# Patient Record
Sex: FEMALE | Race: WHITE | Employment: OTHER | URBAN - NONMETROPOLITAN AREA
[De-identification: names, ages, dates, MRNs, and addresses within clinical notes are randomized per-mention and may not be internally consistent; named-entity substitution may affect disease eponyms.]

---

## 2015-09-24 LAB
AVERAGE GLUCOSE: NORMAL
HBA1C MFR BLD: 5.4 %

## 2017-08-07 LAB — TSH SERPL DL<=0.05 MIU/L-ACNC: NORMAL M[IU]/L

## 2018-12-13 LAB
ALBUMIN SERPL-MCNC: NORMAL G/DL
ALP BLD-CCNC: NORMAL U/L
ALT SERPL-CCNC: NORMAL U/L
ANION GAP SERPL CALCULATED.3IONS-SCNC: NORMAL MMOL/L
AST SERPL-CCNC: NORMAL U/L
BILIRUB SERPL-MCNC: NORMAL MG/DL
BUN BLDV-MCNC: NORMAL MG/DL
CALCIUM SERPL-MCNC: NORMAL MG/DL
CHLORIDE BLD-SCNC: NORMAL MMOL/L
CHOLESTEROL, TOTAL: NORMAL
CHOLESTEROL/HDL RATIO: NORMAL
CO2: NORMAL
CREAT SERPL-MCNC: NORMAL MG/DL
GFR CALCULATED: NORMAL
GLUCOSE BLD-MCNC: NORMAL MG/DL
HDLC SERPL-MCNC: NORMAL MG/DL
LDL CHOLESTEROL CALCULATED: NORMAL
POTASSIUM SERPL-SCNC: NORMAL MMOL/L
SODIUM BLD-SCNC: NORMAL MMOL/L
TOTAL PROTEIN: NORMAL
TRIGL SERPL-MCNC: NORMAL MG/DL
VLDLC SERPL CALC-MCNC: NORMAL MG/DL

## 2019-07-31 ENCOUNTER — OFFICE VISIT (OUTPATIENT)
Dept: FAMILY MEDICINE CLINIC | Age: 59
End: 2019-07-31
Payer: COMMERCIAL

## 2019-07-31 ENCOUNTER — TELEPHONE (OUTPATIENT)
Dept: FAMILY MEDICINE CLINIC | Age: 59
End: 2019-07-31

## 2019-07-31 VITALS
OXYGEN SATURATION: 98 % | DIASTOLIC BLOOD PRESSURE: 76 MMHG | BODY MASS INDEX: 31.28 KG/M2 | WEIGHT: 170 LBS | HEART RATE: 74 BPM | SYSTOLIC BLOOD PRESSURE: 130 MMHG | HEIGHT: 62 IN

## 2019-07-31 DIAGNOSIS — F41.9 ANXIETY: ICD-10-CM

## 2019-07-31 DIAGNOSIS — I10 ESSENTIAL HYPERTENSION: Primary | ICD-10-CM

## 2019-07-31 DIAGNOSIS — Z12.39 SCREENING BREAST EXAMINATION: ICD-10-CM

## 2019-07-31 DIAGNOSIS — Z00.00 ROUTINE GENERAL MEDICAL EXAMINATION AT A HEALTH CARE FACILITY: ICD-10-CM

## 2019-07-31 PROCEDURE — 99396 PREV VISIT EST AGE 40-64: CPT | Performed by: INTERNAL MEDICINE

## 2019-07-31 RX ORDER — LANOLIN ALCOHOL/MO/W.PET/CERES
CREAM (GRAM) TOPICAL
COMMUNITY

## 2019-07-31 RX ORDER — ALPRAZOLAM 0.5 MG/1
0.5 TABLET ORAL 3 TIMES DAILY PRN
COMMUNITY
End: 2019-07-31 | Stop reason: SDUPTHER

## 2019-07-31 RX ORDER — ALPRAZOLAM 0.5 MG/1
0.5 TABLET ORAL 2 TIMES DAILY PRN
Qty: 60 TABLET | Refills: 0 | Status: SHIPPED | OUTPATIENT
Start: 2019-07-31 | End: 2019-10-29

## 2019-07-31 RX ORDER — LISINOPRIL 10 MG/1
TABLET ORAL
Refills: 0 | COMMUNITY
Start: 2019-07-01 | End: 2019-07-31 | Stop reason: SDUPTHER

## 2019-07-31 RX ORDER — LISINOPRIL 10 MG/1
10 TABLET ORAL DAILY
Qty: 90 TABLET | Refills: 1 | Status: SHIPPED | OUTPATIENT
Start: 2019-07-31 | End: 2020-01-15 | Stop reason: SDUPTHER

## 2019-07-31 ASSESSMENT — PATIENT HEALTH QUESTIONNAIRE - PHQ9
SUM OF ALL RESPONSES TO PHQ QUESTIONS 1-9: 0
1. LITTLE INTEREST OR PLEASURE IN DOING THINGS: 0
2. FEELING DOWN, DEPRESSED OR HOPELESS: 0
SUM OF ALL RESPONSES TO PHQ QUESTIONS 1-9: 0
SUM OF ALL RESPONSES TO PHQ9 QUESTIONS 1 & 2: 0

## 2019-08-01 ENCOUNTER — OFFICE VISIT (OUTPATIENT)
Dept: PODIATRY | Age: 59
End: 2019-08-01
Payer: COMMERCIAL

## 2019-08-01 VITALS
BODY MASS INDEX: 31.1 KG/M2 | TEMPERATURE: 97.3 F | DIASTOLIC BLOOD PRESSURE: 72 MMHG | HEART RATE: 70 BPM | SYSTOLIC BLOOD PRESSURE: 117 MMHG | WEIGHT: 169 LBS | OXYGEN SATURATION: 98 % | HEIGHT: 62 IN

## 2019-08-01 DIAGNOSIS — B07.0 PLANTAR VERRUCA: Primary | ICD-10-CM

## 2019-08-01 DIAGNOSIS — M79.671 PAIN IN RIGHT FOOT: ICD-10-CM

## 2019-08-01 DIAGNOSIS — M77.41 METATARSALGIA OF RIGHT FOOT: ICD-10-CM

## 2019-08-01 PROCEDURE — 99203 OFFICE O/P NEW LOW 30 MIN: CPT | Performed by: PODIATRIST

## 2019-08-01 PROCEDURE — 17110 DESTRUCTION B9 LES UP TO 14: CPT | Performed by: PODIATRIST

## 2019-08-01 NOTE — PROGRESS NOTES
Musculoskeletal/ Orthopedic examination:    Equinis: Absent bilateral  Dorsiflexion, plantarflexion, inversion, eversion bilateral 5 out of 5 muscle strength  Wiggling toes  Negative Homans  Tenderness to palpation second and third metatarsal head plantar right foot. Dermatology examination:    No open skin lesions or abrasions bilateral lower extremity. Significant surrounding hyperkeratotic tissue right plantar second and third metatarsal head. Multiple black foci consistent with plantar verruca right plantar second and third metatarsal head. Assessment and Plan:  Angeline was seen today for foot pain. Diagnoses and all orders for this visit:    Plantar verruca    Pain in right foot    Metatarsalgia of right foot        No follow-ups on file. Patient seen as a new referral for right plantar verruca second third metatarsal head. WART TX:   Verbal informed consent was obtained   #15 blade used to debride plantar verruca and sterile manner plantar second and third metatarsal head right foot. Cryo-freeze applied to plantar verruca. Patient tolerated well. Band-aid applied. Salinocaine dispensed. Apply once daily with band-aid as directed. The patient tolerated the procedure well and was given proper instruction for continued care. Follow-up 1 month. Seen By:  Deepti Jones DPM      Document was created using voice recognition software. Note was reviewed, however may contain grammatical errors.

## 2019-08-01 NOTE — PROGRESS NOTES
Emotionally abused: Not on file     Physically abused: Not on file     Forced sexual activity: Not on file   Other Topics Concern    Not on file   Social History Narrative    Not on file       Vitals:    07/31/19 1005   BP: 130/76   Pulse: 74   SpO2: 98%   Weight: 170 lb (77.1 kg)   Height: 5' 2\" (1.575 m)       Physical Exam  Const: Appears well developed and well nourished. Appears overweight. No signs of acute  distress present. Eyes: EOMI in both eyes. PERRL. ENMT: . (External Ears) Tympanic membranes are intact. Neck: Supple and symmetric. Palpation reveals no adenopathy. No masses appreciated. Thyroid exhibits no nodule or thyromegaly. No JVD. Carotids: 2+ and equal bilaterally, without  bruits. Resp: No rales, rhonchi or wheezes appreciated over the lungs bilaterally. CV: Rate is regular. Rhythm is regular. S1 is normal. S2 is normal. No gallop or rubs. No  heart murmur appreciated. Extremities: No clubbing, cyanosis or edema. Abdomen: Bowel sounds are normoactive. Palpation reveals softness, with no distension,  organomegaly or tenderness. No abdominal masses palpable. No palpable hepatosplenomegaly. Musculo: Walks with a normal gait. Lower Extremities: Full ROM bilaterally. Skin: Dry and warm with no rash. Neuro: Alert and oriented x3. Mood is normal. Affect is normal. Speech is articulate and  fluent. Upper Extremities: motor strength is intact. Normal muscle tone bilaterally. Lower  Extremities: motor strength is intact. Normal muscle tone bilaterally. Sensation intact to light  touch. Reflexes: DTR's are symmetric, intact and 2+ bilaterally. Psych: Patient's attitude is cooperative. Patient's affect is appropriate. Judgement is realistic. Insight is appropriate. Lymph nodes none palpable. Breast examination patient declined today.       Assessment and Plan:  Angeline was seen today for hypertension and annual exam.    Diagnoses and all orders for this visit:    Essential hypertension  -

## 2019-08-07 ENCOUNTER — TELEPHONE (OUTPATIENT)
Dept: FAMILY MEDICINE CLINIC | Age: 59
End: 2019-08-07

## 2019-08-07 DIAGNOSIS — Z12.31 ENCOUNTER FOR SCREENING MAMMOGRAM FOR BREAST CANCER: ICD-10-CM

## 2019-08-07 DIAGNOSIS — Z13.1 ENCOUNTER FOR SCREENING FOR DIABETES MELLITUS: ICD-10-CM

## 2019-08-07 NOTE — TELEPHONE ENCOUNTER
[facility] scheduling desk calling in she needs a new dx code added to her screening mammogram. The one we provioded does not pass. Ok to print order and write in a new code?  The ones that passes is z12.31

## 2019-08-19 ENCOUNTER — TELEPHONE (OUTPATIENT)
Dept: ADMINISTRATIVE | Age: 59
End: 2019-08-19

## 2019-08-22 ENCOUNTER — OFFICE VISIT (OUTPATIENT)
Dept: PODIATRY | Age: 59
End: 2019-08-22
Payer: COMMERCIAL

## 2019-08-22 VITALS
HEART RATE: 70 BPM | HEIGHT: 62 IN | BODY MASS INDEX: 31.1 KG/M2 | WEIGHT: 169 LBS | TEMPERATURE: 97 F | OXYGEN SATURATION: 99 %

## 2019-08-22 DIAGNOSIS — B07.0 PLANTAR VERRUCA: Primary | ICD-10-CM

## 2019-08-22 DIAGNOSIS — M79.671 PAIN IN RIGHT FOOT: ICD-10-CM

## 2019-08-22 DIAGNOSIS — M77.41 METATARSALGIA OF RIGHT FOOT: ICD-10-CM

## 2019-08-22 PROCEDURE — 99213 OFFICE O/P EST LOW 20 MIN: CPT | Performed by: PODIATRIST

## 2019-08-22 PROCEDURE — 17110 DESTRUCTION B9 LES UP TO 14: CPT | Performed by: PODIATRIST

## 2019-08-22 NOTE — PROGRESS NOTES
plantarflexion, inversion, eversion bilateral 5 out of 5 muscle strength  Wiggling toes  Negative Homans  Tenderness to palpation second and third metatarsal head plantar right foot. Dermatology examination:    No open skin lesions or abrasions bilateral lower extremity. Significant surrounding hyperkeratotic tissue right plantar second and third metatarsal head. Multiple black foci consistent with plantar verruca right plantar second and third metatarsal head-improving  Surrounding hyperkeratotic tissue right central foot. Assessment and Plan:  Angeline was seen today for foot pain. Diagnoses and all orders for this visit:    Plantar verruca    Pain in right foot    Metatarsalgia of right foot        Return in about 1 month (around 9/22/2019). Follow-up for right plantar verruca second third metatarsal head. WART TX:   Verbal informed consent was obtained   #15 blade used to debride plantar verruca and sterile manner plantar second and third metatarsal head right foot. Salinocaine applied to plantar verruca. Patient tolerated well. Band-aid applied. Salinocaine dispensed. Apply once daily with band-aid as directed. The patient tolerated the procedure well and was given proper instruction for continued care. I did dispense new donut-shaped padding and Band-Aid today. Follow-up 1 month. Seen By:  Deepti Jones DPM      Document was created using voice recognition software. Note was reviewed, however may contain grammatical errors.

## 2019-08-30 PROBLEM — Z00.00 ROUTINE GENERAL MEDICAL EXAMINATION AT A HEALTH CARE FACILITY: Status: RESOLVED | Noted: 2019-07-31 | Resolved: 2019-08-30

## 2019-10-11 DIAGNOSIS — Z12.31 ENCOUNTER FOR SCREENING MAMMOGRAM FOR BREAST CANCER: ICD-10-CM

## 2019-11-25 ENCOUNTER — OFFICE VISIT (OUTPATIENT)
Dept: FAMILY MEDICINE CLINIC | Age: 59
End: 2019-11-25
Payer: COMMERCIAL

## 2019-11-25 VITALS
TEMPERATURE: 98.3 F | HEIGHT: 62 IN | BODY MASS INDEX: 32.2 KG/M2 | OXYGEN SATURATION: 96 % | HEART RATE: 97 BPM | WEIGHT: 175 LBS

## 2019-11-25 DIAGNOSIS — I80.8 PHLEBITIS OF SUPERFICIAL VEIN OF UPPER EXTREMITY: Primary | ICD-10-CM

## 2019-11-25 PROCEDURE — 99213 OFFICE O/P EST LOW 20 MIN: CPT | Performed by: FAMILY MEDICINE

## 2019-11-25 RX ORDER — CEPHALEXIN 500 MG/1
500 CAPSULE ORAL 3 TIMES DAILY
Qty: 21 CAPSULE | Refills: 0 | Status: SHIPPED | OUTPATIENT
Start: 2019-11-25 | End: 2019-12-02

## 2019-12-03 ENCOUNTER — TELEPHONE (OUTPATIENT)
Dept: FAMILY MEDICINE CLINIC | Age: 59
End: 2019-12-03

## 2019-12-17 ENCOUNTER — HOSPITAL ENCOUNTER (OUTPATIENT)
Dept: HOSPITAL 83 - RAD | Age: 59
Discharge: HOME | End: 2019-12-17
Attending: SPECIALIST
Payer: COMMERCIAL

## 2019-12-17 DIAGNOSIS — R13.10: ICD-10-CM

## 2019-12-17 DIAGNOSIS — K21.0: Primary | ICD-10-CM

## 2020-01-15 ENCOUNTER — OFFICE VISIT (OUTPATIENT)
Dept: FAMILY MEDICINE CLINIC | Age: 60
End: 2020-01-15
Payer: COMMERCIAL

## 2020-01-15 VITALS
WEIGHT: 174 LBS | BODY MASS INDEX: 31.83 KG/M2 | OXYGEN SATURATION: 98 % | SYSTOLIC BLOOD PRESSURE: 122 MMHG | HEART RATE: 86 BPM | DIASTOLIC BLOOD PRESSURE: 76 MMHG

## 2020-01-15 PROCEDURE — 99213 OFFICE O/P EST LOW 20 MIN: CPT | Performed by: INTERNAL MEDICINE

## 2020-01-15 RX ORDER — LISINOPRIL 10 MG/1
10 TABLET ORAL DAILY
Qty: 90 TABLET | Refills: 1 | Status: SHIPPED
Start: 2020-01-15 | End: 2020-07-15 | Stop reason: SDUPTHER

## 2020-01-15 RX ORDER — OMEPRAZOLE 40 MG/1
40 CAPSULE, DELAYED RELEASE ORAL DAILY
COMMUNITY
End: 2020-04-16 | Stop reason: ALTCHOICE

## 2020-01-15 ASSESSMENT — PATIENT HEALTH QUESTIONNAIRE - PHQ9
SUM OF ALL RESPONSES TO PHQ QUESTIONS 1-9: 0
1. LITTLE INTEREST OR PLEASURE IN DOING THINGS: 0
SUM OF ALL RESPONSES TO PHQ9 QUESTIONS 1 & 2: 0
SUM OF ALL RESPONSES TO PHQ QUESTIONS 1-9: 0
2. FEELING DOWN, DEPRESSED OR HOPELESS: 0

## 2020-01-15 NOTE — PROGRESS NOTES
muscle tone bilaterally. Sensation intact to light  touch. Reflexes: DTR's are symmetric, intact and 2+ bilaterally. Psych: Patient's attitude is cooperative. Patient's affect is appropriate. Judgement is realistic. Insight is appropriate. Lymph nodes none palpable. Breast examination patient declined today. Assessment and Plan:  Angeline was seen today for hypertension. Diagnoses and all orders for this visit:    Anxiety    Essential hypertension  -     lisinopril (PRINIVIL;ZESTRIL) 10 MG tablet; Take 1 tablet by mouth daily  -     Cancel: Lipid Panel; Future  -     Cancel: Comprehensive Metabolic Panel; Future  -     Cancel: CBC Auto Differential; Future  -     Cancel: Urinalysis; Future  -     Urinalysis; Future  -     Lipid Panel; Future  -     Comprehensive Metabolic Panel; Future  -     CBC Auto Differential; Future      Plan: I will see her back in 6 months for complete examination. Prescription management performed. Blood work in the next couple weeks. Should be fasting at that time. Follow-up with ear nose and throat regarding her swallow study. Weight loss attempts. Continue to monitor blood pressure closely. Notify us with problems in the interim. Return in about 6 months (around 7/15/2020). Seen By:  Kuldeep Pizarro MD      *Document was created using voice recognition software. Note was reviewed however may contain grammatical errors.

## 2020-04-15 ENCOUNTER — TELEPHONE (OUTPATIENT)
Dept: PRIMARY CARE CLINIC | Age: 60
End: 2020-04-15

## 2020-04-16 ENCOUNTER — OFFICE VISIT (OUTPATIENT)
Dept: PRIMARY CARE CLINIC | Age: 60
End: 2020-04-16
Payer: COMMERCIAL

## 2020-04-16 VITALS
WEIGHT: 172 LBS | BODY MASS INDEX: 31.65 KG/M2 | OXYGEN SATURATION: 98 % | HEART RATE: 81 BPM | HEIGHT: 62 IN | SYSTOLIC BLOOD PRESSURE: 124 MMHG | DIASTOLIC BLOOD PRESSURE: 78 MMHG | TEMPERATURE: 98 F

## 2020-04-16 PROCEDURE — 99213 OFFICE O/P EST LOW 20 MIN: CPT | Performed by: INTERNAL MEDICINE

## 2020-04-16 NOTE — PROGRESS NOTES
[facility]     20  Damon Vyas : 1960 Sex: female  Age: 62 y.o. Chief Complaint   Patient presents with    Hypertension       HPI  Patient presents to the office today for additional visit concerned about elevated blood pressure. She has not been feeling very well recently scribes some facial pressure off-and-on headache recent vertiginous symptoms which now have improved quite a bit. No other neurological complaints. States pressures have been running in the 140 and 150 range. She brings her blood pressure machine in today and I compared it to ours. It was fairly close and she was running in the 140 range when I checked it. Says she is been under a lot of stress recently with this COVID 19 situation. Thinks that might be playing a role in all of this also. Otherwise had been feeling well. She has been compliant with taking medication. She is currently on 10 mg lisinopril for blood pressure. Review of Systems     Const: Denies chills, fever and sweats. Eyes: Denies eye symptoms. ENMT: Some facial pressure over sinuses  CV: Denies chest pain, orthopnea and palpitations. Resp: Denies cough, SOB and wheezing. GI: Denies diarrhea, nausea and vomiting. : Urinary: denies dysuria, frequency and frequent UTI's. Musculo: Denies musculoskeletal symptoms. Skin: Denies eczema, pruritus and sores. Neuro: Denies dizziness,, seizures and syncope. Positive vertigo and headache-see above   Psych: Reports anxiety and stress//i see above    REST OF PERTINENT ROS GONE OVER AND WAS NEGATIVE.                Current Outpatient Medications:     lisinopril (PRINIVIL;ZESTRIL) 10 MG tablet, Take 1 tablet by mouth daily, Disp: 90 tablet, Rfl: 1    vitamin B-12 (CYANOCOBALAMIN) 1000 MCG tablet, Take by mouth, Disp: , Rfl:   No Known Allergies    Past Medical History:   Diagnosis Date    Anxiety 2019    Depression     Essential hypertension 2019     Past Surgical History:

## 2020-07-15 ENCOUNTER — HOSPITAL ENCOUNTER (OUTPATIENT)
Age: 60
Discharge: HOME | End: 2020-07-17
Payer: COMMERCIAL

## 2020-07-15 ENCOUNTER — OFFICE VISIT (OUTPATIENT)
Dept: FAMILY MEDICINE CLINIC | Age: 60
End: 2020-07-15
Payer: COMMERCIAL

## 2020-07-15 VITALS
HEART RATE: 76 BPM | BODY MASS INDEX: 30.59 KG/M2 | WEIGHT: 166.2 LBS | DIASTOLIC BLOOD PRESSURE: 82 MMHG | TEMPERATURE: 98.1 F | SYSTOLIC BLOOD PRESSURE: 130 MMHG | OXYGEN SATURATION: 98 % | HEIGHT: 62 IN

## 2020-07-15 LAB
ALBUMIN SERPL-MCNC: 5 G/DL (ref 3.5–5.2)
ALP BLD-CCNC: 58 U/L (ref 35–104)
ALT SERPL-CCNC: 25 U/L (ref 0–32)
ANION GAP SERPL CALCULATED.3IONS-SCNC: 22 MMOL/L (ref 7–16)
AST SERPL-CCNC: 23 U/L (ref 0–31)
BASOPHILS ABSOLUTE: 0.01 E9/L (ref 0–0.2)
BASOPHILS RELATIVE PERCENT: 0.2 % (ref 0–2)
BILIRUB SERPL-MCNC: 0.4 MG/DL (ref 0–1.2)
BUN BLDV-MCNC: 15 MG/DL (ref 8–23)
CALCIUM SERPL-MCNC: 11.4 MG/DL (ref 8.6–10.2)
CHLORIDE BLD-SCNC: 100 MMOL/L (ref 98–107)
CHOLESTEROL, TOTAL: 257 MG/DL (ref 0–199)
CO2: 21 MMOL/L (ref 22–29)
CREAT SERPL-MCNC: 0.8 MG/DL (ref 0.5–1)
EOSINOPHILS ABSOLUTE: 0.03 E9/L (ref 0.05–0.5)
EOSINOPHILS RELATIVE PERCENT: 0.7 % (ref 0–6)
GFR AFRICAN AMERICAN: >60
GFR NON-AFRICAN AMERICAN: >60 ML/MIN/1.73
GLUCOSE BLD-MCNC: 94 MG/DL (ref 74–99)
HCT VFR BLD CALC: 48.7 % (ref 34–48)
HDLC SERPL-MCNC: 67 MG/DL
HEMOGLOBIN: 15.5 G/DL (ref 11.5–15.5)
IMMATURE GRANULOCYTES #: 0.01 E9/L
IMMATURE GRANULOCYTES %: 0.2 % (ref 0–5)
LDL CHOLESTEROL CALCULATED: 159 MG/DL (ref 0–99)
LYMPHOCYTES ABSOLUTE: 1.71 E9/L (ref 1.5–4)
LYMPHOCYTES RELATIVE PERCENT: 37.7 % (ref 20–42)
MCH RBC QN AUTO: 29.7 PG (ref 26–35)
MCHC RBC AUTO-ENTMCNC: 31.8 % (ref 32–34.5)
MCV RBC AUTO: 93.3 FL (ref 80–99.9)
MONOCYTES ABSOLUTE: 0.29 E9/L (ref 0.1–0.95)
MONOCYTES RELATIVE PERCENT: 6.4 % (ref 2–12)
NEUTROPHILS ABSOLUTE: 2.48 E9/L (ref 1.8–7.3)
NEUTROPHILS RELATIVE PERCENT: 54.8 % (ref 43–80)
PDW BLD-RTO: 12.9 FL (ref 11.5–15)
PLATELET # BLD: 230 E9/L (ref 130–450)
PMV BLD AUTO: 10.3 FL (ref 7–12)
POTASSIUM SERPL-SCNC: 4.5 MMOL/L (ref 3.5–5)
RBC # BLD: 5.22 E12/L (ref 3.5–5.5)
SODIUM BLD-SCNC: 143 MMOL/L (ref 132–146)
TOTAL PROTEIN: 7.9 G/DL (ref 6.4–8.3)
TRIGL SERPL-MCNC: 154 MG/DL (ref 0–149)
VLDLC SERPL CALC-MCNC: 31 MG/DL
WBC # BLD: 4.5 E9/L (ref 4.5–11.5)

## 2020-07-15 PROCEDURE — 36415 COLL VENOUS BLD VENIPUNCTURE: CPT

## 2020-07-15 PROCEDURE — 99396 PREV VISIT EST AGE 40-64: CPT | Performed by: INTERNAL MEDICINE

## 2020-07-15 PROCEDURE — 80061 LIPID PANEL: CPT

## 2020-07-15 PROCEDURE — 80053 COMPREHEN METABOLIC PANEL: CPT

## 2020-07-15 PROCEDURE — 85025 COMPLETE CBC W/AUTO DIFF WBC: CPT

## 2020-07-15 RX ORDER — LISINOPRIL 10 MG/1
10 TABLET ORAL DAILY
Qty: 90 TABLET | Refills: 1 | Status: SHIPPED
Start: 2020-07-15 | End: 2021-01-14 | Stop reason: SDUPTHER

## 2020-07-15 NOTE — PROGRESS NOTES
[id number]     7/15/20  Peter Smith : 1960 Sex: female  Age: 62 y.o. Chief Complaint   Patient presents with    Annual Exam    Hypertension       HPI  Patient presents today for 6-month follow-up/annual follow-up preventative/complete exam.  Blood pressure is been in good range. States she has been a little more anxious recently but in general is been doing well. She has intentionally lost 8 pounds and more active limiting her diet etc.  She has had no further dizziness. Review of Systems     Const: Denies chills, fever and sweats. Eyes: Denies eye symptoms. ENMT: Reports hearing difficulty and tinnitus of the right ear/improved Reports postnasal drip, but  denies other nasal symptoms. Denies mouth or throat symptoms. CV: Denies chest pain, orthopnea and palpitations. Resp: Denies cough, SOB and wheezing. GI: Denies diarrhea, nausea and vomiting. : Urinary: denies dysuria, frequency and frequent UTI's. Musculo: Denies musculoskeletal symptoms. Skin: Denies eczema, pruritus and sores. Neuro: Denies dizziness, headache, seizures and syncope. Psych: Reports anxiety and stress//improved since [facility]    REST OF PERTINENT ROS GONE OVER AND WAS NEGATIVE. PMH:  Problem List: Essential hypertension  Health Maintenance:  Mammogram - (2017)  Mammogram Screening - (2017)  Colonoscopy - -due 29  Pelvic/Pap Exam - gyn-no longer seeing  Rectal Exam - gyn-no longer seeing  Breast Exam - gyn declined  Mini Mental Status - -  EKG -   Medical Problems:  Hypertension, Depression, Right kidney removed, thyroid nodule removed, 2 c-sections, appy,  cholecystectomy  hysterectomy - bleeding  salivary duct stone surg, Anxiety  Reviewed and updated. FH:  Father:  Peter Smith  1960 Page #2  MI -  age 61. Mother:  Chronic Obstructive Pulmonary Disease (COPD)  Heart Disease -  age 67. Brother 1:  None. Brother 2:  None.   Sister 1: None. Sister 2:  None. Reviewed, no changes. 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 Alcohol: Occasionally consumes alcohol.              Current Outpatient Medications:     lisinopril (PRINIVIL;ZESTRIL) 10 MG tablet, Take 1 tablet by mouth daily, Disp: 90 tablet, Rfl: 1    vitamin B-12 (CYANOCOBALAMIN) 1000 MCG tablet, Take by mouth, Disp: , Rfl:   No Known Allergies    Past Medical History:   Diagnosis Date    Anxiety 2019    Depression     Essential hypertension 2019     Past Surgical History:   Procedure Laterality Date    APPENDECTOMY       SECTION      x2    CHOLECYSTECTOMY      HYSTERECTOMY      KIDNEY REMOVAL Right      Family History   Problem Relation Age of Onset    COPD Mother     Heart Disease Mother     Heart Attack Father      Social History     Socioeconomic History    Marital status:      Spouse name: Not on file    Number of children: Not on file    Years of education: Not on file    Highest education level: Not on file   Occupational History    Not on file   Social Needs    Financial resource strain: Not on file    Food insecurity     Worry: Not on file     Inability: Not on file    Transportation needs     Medical: Not on file     Non-medical: Not on file   Tobacco Use    Smoking status: Never Smoker    Smokeless tobacco: Never Used   Substance and Sexual Activity    Alcohol use: Yes     Comment: Occasionally    Drug use: Never    Sexual activity: Not on file   Lifestyle    Physical activity     Days per week: Not on file     Minutes per session: Not on file    Stress: Not on file   Relationships    Social connections     Talks on phone: Not on file     Gets together: Not on file     Attends Oriental orthodox service: Not on file     Active member of club or organization: Not on file     Attends meetings of clubs or organizations: Not on file     Relationship status: Not on file   Farzana Gage Intimate partner violence     Fear of current or ex partner: Not on file     Emotionally abused: Not on file     Physically abused: Not on file     Forced sexual activity: Not on file   Other Topics Concern    Not on file   Social History Narrative    Not on file       Vitals:    07/15/20 1040   BP: 130/82   Pulse: 76   Temp: 98.1 °F (36.7 °C)   TempSrc: Temporal   SpO2: 98%   Weight: 166 lb 3.2 oz (75.4 kg)   Height: 5' 2\" (1.575 m)       Physical Exam      Const: Appears well developed and well nourished. Appears overweight. No signs of acute  distress present. Eyes: EOMI in both eyes. PERRL. ENMT: . (External Ears) Tympanic membranes are intact. Neck: Supple and symmetric. Palpation reveals no adenopathy. No masses appreciated. Thyroid exhibits no nodule or thyromegaly. No JVD. Carotids: 2+ and equal bilaterally, without  bruits. Resp: No rales, rhonchi or wheezes appreciated over the lungs bilaterally. CV: Rate is regular. Rhythm is regular. S1 is normal. S2 is normal. No gallop or rubs. No  heart murmur appreciated. Extremities: No clubbing, cyanosis or edema. Abdomen: Bowel sounds are normoactive. Palpation reveals softness, with no distension,  organomegaly or tenderness. No abdominal masses palpable. No palpable hepatosplenomegaly. Musculo: Walks with a normal gait. Lower Extremities: Full ROM bilaterally. Skin: Dry and warm with no rash. She does have some scarring to her right arm at the previous bite site. Neuro: Alert and oriented x3. Mood is normal. Affect is normal. Speech is articulate and  fluent. Upper Extremities: motor strength is intact. Normal muscle tone bilaterally. Lower  Extremities: motor strength is intact. Normal muscle tone bilaterally. Sensation intact to light  touch. Reflexes: DTR's are symmetric, intact and 2+ bilaterally. Psych: Patient's attitude is cooperative. Patient's affect is appropriate. Judgement is realistic. Insight is appropriate.   Lymph nodes none palpable. Breast examination patient declined today.       Assessment and Plan:  Angeline was seen today for annual exam and hypertension. Diagnoses and all orders for this visit:    Routine general medical examination at a health care facility    Essential hypertension  -     lisinopril (PRINIVIL;ZESTRIL) 10 MG tablet; Take 1 tablet by mouth daily  -     CBC Auto Differential; Future  -     Comprehensive Metabolic Panel; Future  -     Lipid Panel; Future    Anxiety      Plan: Continue weight loss attempts. Stay active. Blood work today which she did not get 6 months ago to monitor disease progression medication use. Prescription management performed. Continue to monitor blood pressure closely. She did have questions about some herbal supplements which I told her I really could not recommend taking. I told her result to her she want to try it for a short period of time this was hops flower. Notify me with problems in the interim. Return in about 6 months (around 1/15/2021). Seen By:  Terri Jesus MD      *Document was created using voice recognition software. Note was reviewed however may contain grammatical errors.

## 2020-07-16 ENCOUNTER — TELEPHONE (OUTPATIENT)
Dept: FAMILY MEDICINE CLINIC | Age: 60
End: 2020-07-16

## 2020-07-16 NOTE — TELEPHONE ENCOUNTER
Stop the Tums and any other over-the-counter calcium. Repeat calcium level in 1 month  Can use Pepcid or omeprazole for heartburn.

## 2020-08-21 ENCOUNTER — HOSPITAL ENCOUNTER (OUTPATIENT)
Age: 60
Discharge: HOME | End: 2020-08-23
Payer: COMMERCIAL

## 2020-08-21 ENCOUNTER — TELEPHONE (OUTPATIENT)
Dept: FAMILY MEDICINE CLINIC | Age: 60
End: 2020-08-21

## 2020-08-21 LAB
ALBUMIN SERPL-MCNC: 4.8 G/DL (ref 3.5–5.2)
ALP BLD-CCNC: 57 U/L (ref 35–104)
ALT SERPL-CCNC: 24 U/L (ref 0–32)
ANION GAP SERPL CALCULATED.3IONS-SCNC: 17 MMOL/L (ref 7–16)
AST SERPL-CCNC: 17 U/L (ref 0–31)
BASOPHILS ABSOLUTE: 0.02 E9/L (ref 0–0.2)
BASOPHILS RELATIVE PERCENT: 0.4 % (ref 0–2)
BILIRUB SERPL-MCNC: 0.4 MG/DL (ref 0–1.2)
BUN BLDV-MCNC: 15 MG/DL (ref 8–23)
CALCIUM SERPL-MCNC: 10.5 MG/DL (ref 8.6–10.2)
CHLORIDE BLD-SCNC: 103 MMOL/L (ref 98–107)
CHOLESTEROL, TOTAL: 225 MG/DL (ref 0–199)
CO2: 23 MMOL/L (ref 22–29)
CREAT SERPL-MCNC: 0.8 MG/DL (ref 0.5–1)
EOSINOPHILS ABSOLUTE: 0.04 E9/L (ref 0.05–0.5)
EOSINOPHILS RELATIVE PERCENT: 0.8 % (ref 0–6)
GFR AFRICAN AMERICAN: >60
GFR NON-AFRICAN AMERICAN: >60 ML/MIN/1.73
GLUCOSE BLD-MCNC: 118 MG/DL (ref 74–99)
HCT VFR BLD CALC: 47.6 % (ref 34–48)
HDLC SERPL-MCNC: 62 MG/DL
HEMOGLOBIN: 15.4 G/DL (ref 11.5–15.5)
IMMATURE GRANULOCYTES #: 0.01 E9/L
IMMATURE GRANULOCYTES %: 0.2 % (ref 0–5)
LDL CHOLESTEROL CALCULATED: 125 MG/DL (ref 0–99)
LYMPHOCYTES ABSOLUTE: 1.73 E9/L (ref 1.5–4)
LYMPHOCYTES RELATIVE PERCENT: 35.4 % (ref 20–42)
MCH RBC QN AUTO: 29.6 PG (ref 26–35)
MCHC RBC AUTO-ENTMCNC: 32.4 % (ref 32–34.5)
MCV RBC AUTO: 91.5 FL (ref 80–99.9)
MONOCYTES ABSOLUTE: 0.35 E9/L (ref 0.1–0.95)
MONOCYTES RELATIVE PERCENT: 7.2 % (ref 2–12)
NEUTROPHILS ABSOLUTE: 2.74 E9/L (ref 1.8–7.3)
NEUTROPHILS RELATIVE PERCENT: 56 % (ref 43–80)
PDW BLD-RTO: 12.8 FL (ref 11.5–15)
PLATELET # BLD: 231 E9/L (ref 130–450)
PMV BLD AUTO: 10.2 FL (ref 7–12)
POTASSIUM SERPL-SCNC: 4.2 MMOL/L (ref 3.5–5)
RBC # BLD: 5.2 E12/L (ref 3.5–5.5)
SODIUM BLD-SCNC: 143 MMOL/L (ref 132–146)
TOTAL PROTEIN: 7.8 G/DL (ref 6.4–8.3)
TRIGL SERPL-MCNC: 191 MG/DL (ref 0–149)
VLDLC SERPL CALC-MCNC: 38 MG/DL
WBC # BLD: 4.9 E9/L (ref 4.5–11.5)

## 2020-08-21 PROCEDURE — 36415 COLL VENOUS BLD VENIPUNCTURE: CPT

## 2020-08-21 PROCEDURE — 80053 COMPREHEN METABOLIC PANEL: CPT

## 2020-08-21 PROCEDURE — 85025 COMPLETE CBC W/AUTO DIFF WBC: CPT

## 2020-08-21 PROCEDURE — 80061 LIPID PANEL: CPT

## 2020-08-21 NOTE — TELEPHONE ENCOUNTER
Although calcium is still slightly elevated it is much improved off of the Tums. Have her stay away from the calcium supplements. Will follow down the road. Also if she was fasting blood sugar was high at 118. Limit concentrated sweets and decrease carbohydrate intake.

## 2021-01-14 ENCOUNTER — TELEPHONE (OUTPATIENT)
Dept: FAMILY MEDICINE CLINIC | Age: 61
End: 2021-01-14

## 2021-01-14 ENCOUNTER — OFFICE VISIT (OUTPATIENT)
Dept: FAMILY MEDICINE CLINIC | Age: 61
End: 2021-01-14
Payer: COMMERCIAL

## 2021-01-14 VITALS
OXYGEN SATURATION: 98 % | HEART RATE: 80 BPM | BODY MASS INDEX: 31.58 KG/M2 | SYSTOLIC BLOOD PRESSURE: 152 MMHG | WEIGHT: 171.6 LBS | TEMPERATURE: 97.5 F | DIASTOLIC BLOOD PRESSURE: 86 MMHG | HEIGHT: 62 IN

## 2021-01-14 DIAGNOSIS — I10 ESSENTIAL HYPERTENSION: ICD-10-CM

## 2021-01-14 DIAGNOSIS — K21.9 GASTROESOPHAGEAL REFLUX DISEASE, UNSPECIFIED WHETHER ESOPHAGITIS PRESENT: ICD-10-CM

## 2021-01-14 DIAGNOSIS — E83.52 HYPERCALCEMIA: Primary | ICD-10-CM

## 2021-01-14 LAB
ANION GAP SERPL CALCULATED.3IONS-SCNC: 23 MMOL/L (ref 7–16)
BUN BLDV-MCNC: 15 MG/DL (ref 8–23)
CALCIUM SERPL-MCNC: 11 MG/DL (ref 8.6–10.2)
CHLORIDE BLD-SCNC: 106 MMOL/L (ref 98–107)
CO2: 19 MMOL/L (ref 22–29)
CREAT SERPL-MCNC: 0.8 MG/DL (ref 0.5–1)
GFR AFRICAN AMERICAN: >60
GFR NON-AFRICAN AMERICAN: >60 ML/MIN/1.73
GLUCOSE BLD-MCNC: 118 MG/DL (ref 74–99)
POTASSIUM SERPL-SCNC: 4.4 MMOL/L (ref 3.5–5)
SODIUM BLD-SCNC: 148 MMOL/L (ref 132–146)

## 2021-01-14 PROCEDURE — 99213 OFFICE O/P EST LOW 20 MIN: CPT | Performed by: INTERNAL MEDICINE

## 2021-01-14 RX ORDER — LISINOPRIL 10 MG/1
10 TABLET ORAL DAILY
Qty: 90 TABLET | Refills: 1 | Status: SHIPPED
Start: 2021-01-14 | End: 2021-07-14

## 2021-01-14 ASSESSMENT — PATIENT HEALTH QUESTIONNAIRE - PHQ9
SUM OF ALL RESPONSES TO PHQ QUESTIONS 1-9: 0

## 2021-01-14 NOTE — PROGRESS NOTES
[address] [facility] [address] [facility]     21  James Villa : 1960 Sex: female  Age: 56 y.o. Chief Complaint   Patient presents with    Hypertension    Anxiety       HPI    Patient presents today for 6-month follow-up visit on her medical problems. She tells me that her blood pressures have been running in a good range. She is on lisinopril currently tolerating this well. Did state that in the last few days it has been a little bit higher for some reason. I did ask her to keep a close eye on it as it was swabbed for her today. If stays in this range will need to adjust her medications. She has also had some heartburn issues for some time off and on she has tried Prilosec which caused significant diarrhea and switched to Pepcid which likewise did cause diarrhea although less so. She tried Tums which raised her calcium level and had to stop. She did have an upper GI evaluation apparently at [facility] through ear nose and throat doctor elisa which I do not have copy of report at this time but will attempt to get. I did review last labs which she states she was fasting with and blood sugar was 118. Calcium was 10.5. She did stop her calcium at that time and I told her I was going to repeat numbers here today. Weight is up 5 pounds. Review of Systems     Const: Denies chills, fever and sweats. Eyes: Denies eye symptoms. ENMT: Reports hearing difficulty and tinnitus of the right ear/improved Reports postnasal drip, but  denies other nasal symptoms. Denies mouth or throat symptoms. CV: Denies chest pain, orthopnea and palpitations. Resp: Denies cough, SOB and wheezing. GI: Denies diarrhea, nausea and vomiting. She has had reflux symptoms.-See above  : Urinary: denies dysuria, frequency and frequent UTI's. Musculo: Denies musculoskeletal symptoms. Skin: Denies eczema, pruritus and sores. Neuro: Denies dizziness, headache, seizures and syncope.   Psych: Reports anxiety and stress//improved since [facility]       REST OF PERTINENT ROS GONE OVER AND WAS NEGATIVE.      Current Outpatient Medications:     lisinopril (PRINIVIL;ZESTRIL) 10 MG tablet, Take 1 tablet by mouth daily, Disp: 90 tablet, Rfl: 1    vitamin B-12 (CYANOCOBALAMIN) 1000 MCG tablet, Take by mouth, Disp: , Rfl:   No Known Allergies    Past Medical History:   Diagnosis Date    Anxiety 2019    Depression     Essential hypertension 2019     Past Surgical History:   Procedure Laterality Date    APPENDECTOMY       SECTION      x2    CHOLECYSTECTOMY      HYSTERECTOMY      KIDNEY REMOVAL Right      Family History   Problem Relation Age of Onset    COPD Mother     Heart Disease Mother     Heart Attack Father      Social History     Socioeconomic History    Marital status:      Spouse name: Not on file    Number of children: Not on file    Years of education: Not on file    Highest education level: Not on file   Occupational History    Not on file   Social Needs    Financial resource strain: Not on file    Food insecurity     Worry: Not on file     Inability: Not on file    Transportation needs     Medical: Not on file     Non-medical: Not on file   Tobacco Use    Smoking status: Never Smoker    Smokeless tobacco: Never Used   Substance and Sexual Activity    Alcohol use: Yes     Comment: Occasionally    Drug use: Never    Sexual activity: Not on file   Lifestyle    Physical activity     Days per week: Not on file     Minutes per session: Not on file    Stress: Not on file   Relationships    Social connections     Talks on phone: Not on file     Gets together: Not on file     Attends Rastafarian service: Not on file     Active member of club or organization: Not on file     Attends meetings of clubs or organizations: Not on file     Relationship status: Not on file    Intimate partner violence     Fear of current or ex partner: Not on file     Emotionally abused: Not on file Physically abused: Not on file     Forced sexual activity: Not on file   Other Topics Concern    Not on file   Social History Narrative    Not on file       Vitals:    01/14/21 0834   BP: (!) 150/90   Pulse: 80   Temp: 97.5 °F (36.4 °C)   TempSrc: Temporal   SpO2: 98%   Weight: 171 lb 9.6 oz (77.8 kg)   Height: 5' 2\" (1.575 m)       Physical Exam  Const: Appears well developed and well nourished. Appears overweight. No signs of acute  distress present. Eyes: EOMI in both eyes. PERRL. ENMT: . (External Ears) Tympanic membranes are intact. Neck: Supple and symmetric. Palpation reveals no adenopathy. No masses appreciated. Thyroid exhibits no nodule or thyromegaly. No JVD. Carotids: 2+ and equal bilaterally, without  bruits. Resp: No rales, rhonchi or wheezes appreciated over the lungs bilaterally. CV: Rate is regular. Rhythm is regular. S1 is normal. S2 is normal. No gallop or rubs. No  heart murmur appreciated. Extremities: No clubbing, cyanosis or edema. Abdomen: Bowel sounds are normoactive. Palpation reveals softness, with no distension,  organomegaly or tenderness. No abdominal masses palpable. No palpable hepatosplenomegaly.   Psych: Patient's attitude is cooperative. Patient's affect is appropriate. Judgement is realistic. Insight is appropriate          Assessment and Plan:  Angeline was seen today for hypertension and anxiety. Diagnoses and all orders for this visit:    Essential hypertension  -     lisinopril (PRINIVIL;ZESTRIL) 10 MG tablet; Take 1 tablet by mouth daily  -     Basic Metabolic Panel; Future    Gastroesophageal reflux disease, unspecified whether esophagitis present    : I will see her back in 6 months. .  Will attempt to get your nose and throat evaluation of upper GI from [facility] to review. Blood work today to monitor disease progression and medication use. Prescription management performed.   If symptoms persist may need to have gastroenterology look at her for scoping. Notify us of problems in the interim. Return in about 6 months (around 7/14/2021). Seen By:  Kate Del Valle MD      *Document was created using voice recognition software. Note was reviewed however may contain grammatical errors.

## 2021-01-15 DIAGNOSIS — E83.52 HYPERCALCEMIA: ICD-10-CM

## 2021-01-15 LAB
CALCIUM IONIZED: 1.31 MMOL/L (ref 1.15–1.33)
CALCIUM SERPL-MCNC: 10.4 MG/DL (ref 8.6–10.2)

## 2021-01-21 ENCOUNTER — TELEPHONE (OUTPATIENT)
Dept: FAMILY MEDICINE CLINIC | Age: 61
End: 2021-01-21

## 2021-01-21 DIAGNOSIS — E83.52 HYPERCALCEMIA: Primary | ICD-10-CM

## 2021-01-21 NOTE — TELEPHONE ENCOUNTER
I know she has 1 kidney.   I need the PTH I put an order in for PTH and calcium to be drawn at the same time see if she can have this done in the next week sometime

## 2021-01-26 DIAGNOSIS — E83.52 HYPERCALCEMIA: ICD-10-CM

## 2021-01-26 LAB
CALCIUM SERPL-MCNC: 10.1 MG/DL (ref 8.6–10.2)
PARATHYROID HORMONE INTACT: 42 PG/ML (ref 15–65)

## 2021-01-27 ENCOUNTER — TELEPHONE (OUTPATIENT)
Dept: FAMILY MEDICINE CLINIC | Age: 61
End: 2021-01-27

## 2021-02-02 ENCOUNTER — TELEPHONE (OUTPATIENT)
Dept: FAMILY MEDICINE CLINIC | Age: 61
End: 2021-02-02

## 2021-02-02 DIAGNOSIS — Z12.31 OTHER SCREENING MAMMOGRAM: Primary | ICD-10-CM

## 2021-02-02 NOTE — TELEPHONE ENCOUNTER
Nehal calling for an order for an annual mammogram. [place] [facility]

## 2021-02-02 NOTE — TELEPHONE ENCOUNTER
Notified patient. Patient verbalized understanding. Pt will have her mammogram done at [facility]. Transferred call to [place].

## 2021-04-08 ENCOUNTER — OFFICE VISIT (OUTPATIENT)
Dept: PODIATRY | Age: 61
End: 2021-04-08
Payer: COMMERCIAL

## 2021-04-08 ENCOUNTER — TELEPHONE (OUTPATIENT)
Dept: FAMILY MEDICINE CLINIC | Age: 61
End: 2021-04-08

## 2021-04-08 DIAGNOSIS — B07.0 PLANTAR VERRUCA: ICD-10-CM

## 2021-04-08 DIAGNOSIS — L84 CORNS AND CALLOSITIES: ICD-10-CM

## 2021-04-08 DIAGNOSIS — M79.671 PAIN IN RIGHT FOOT: Primary | ICD-10-CM

## 2021-04-08 DIAGNOSIS — E83.52 HYPERCALCEMIA: ICD-10-CM

## 2021-04-08 LAB
CALCIUM IONIZED: 1.34 MMOL/L (ref 1.15–1.33)
CALCIUM SERPL-MCNC: 9.9 MG/DL (ref 8.6–10.2)
PARATHYROID HORMONE INTACT: 36 PG/ML (ref 15–65)

## 2021-04-08 PROCEDURE — 99213 OFFICE O/P EST LOW 20 MIN: CPT | Performed by: PODIATRIST

## 2021-04-08 PROCEDURE — 17110 DESTRUCTION B9 LES UP TO 14: CPT | Performed by: PODIATRIST

## 2021-04-08 NOTE — PROGRESS NOTES
Patient in office to follow up with wart on bottom of right foot. Beryl Tafoya : 1960 Sex: female  Age: 56 y.o. Patient was referred by Yumiko Mcdonough MD    CC:    Follow-up painful right foot with plantar wart    HPI:   Follow-up painful plantar wart right foot  Has had recurrence of plantar warts on bottom right foot over the past several months. Was treated for warts in the past in 2019 and was progressing very well. Has noticed recurrence over the past few months. No recent formal treatment or therapy. Does have increased callus tissue and pain on the bottom of the right foot worse when she is walking. No open wounds. No known recent injury. ROS:  Const: Denies constitutional symptoms  Musculo: Denies symptoms other than stated above  Skin: Denies symptoms other than stated above       Current Outpatient Medications:     lisinopril (PRINIVIL;ZESTRIL) 10 MG tablet, Take 1 tablet by mouth daily, Disp: 90 tablet, Rfl: 1    vitamin B-12 (CYANOCOBALAMIN) 1000 MCG tablet, Take by mouth, Disp: , Rfl:   No Known Allergies    Past Medical History:   Diagnosis Date    Anxiety 2019    Depression     Essential hypertension 2019           There were no vitals filed for this visit. Work History/Social History:     Focused Lower Extremity Physical Exam:    Neurovascular examination:    Dorsalis Pedis palpable bilateral.  Posterior tibialis palpable bilateral.    Capillary Refill Time:  Immediate return  Hair growth:  Symmetrical and bilateral   Skin:  Not atrophic  Edema: No edema bilateral feet or ankles. Neurologic:  Light touch intact bilateral.      Musculoskeletal/ Orthopedic examination:    Equinis: Absent bilateral  Dorsiflexion, plantarflexion, inversion, eversion bilateral 5 out of 5 muscle strength  Wiggling toes  Negative Homans  Tenderness to palpation second, third and fourth metatarsal head right foot where there is significant hyperkeratotic tissue.     Dermatology examination:    Hyperkeratotic tissue plantar second, third and fourth metatarsal head right foot. Multiple black foci consistent with plantar verruca. After debridement mild bleeding noted. Assessment and Plan:  Angeline was seen today for follow-up and verruca vulgaris. Diagnoses and all orders for this visit:    Pain in right foot    Plantar verruca    Corns and callosities        Return in about 1 month (around 5/8/2021). Right foot warts. Several new warts on the plantar right foot    WART TX:   Verbal informed consent was obtained   #15 blade used to debride plantar verruca and sterile manner  Cryofreeze applied to plantar verruca. Patient tolerated well. Band-aid applied. Salinocaine dispensed. Apply once daily with band-aid as directed. The patient tolerated the procedure well and was given proper instruction for continued care. Padding and band-aid applied today remove tonight. Follow-up 1 month    Seen By:  Nick Dennis DPM      Document was created using voice recognition software. Note was reviewed, however may contain grammatical errors.

## 2021-04-09 NOTE — TELEPHONE ENCOUNTER
Letter sent to patient letting her know that her labs are stable. Attached a copy of the results for her records.

## 2021-05-13 ENCOUNTER — OFFICE VISIT (OUTPATIENT)
Dept: PODIATRY | Age: 61
End: 2021-05-13
Payer: COMMERCIAL

## 2021-05-13 VITALS — WEIGHT: 172 LBS | BODY MASS INDEX: 31.65 KG/M2 | HEIGHT: 62 IN

## 2021-05-13 DIAGNOSIS — B07.0 PLANTAR VERRUCA: ICD-10-CM

## 2021-05-13 DIAGNOSIS — L84 CORNS AND CALLOSITIES: ICD-10-CM

## 2021-05-13 DIAGNOSIS — M79.671 PAIN IN RIGHT FOOT: Primary | ICD-10-CM

## 2021-05-13 PROCEDURE — 17110 DESTRUCTION B9 LES UP TO 14: CPT | Performed by: PODIATRIST

## 2021-05-13 RX ORDER — SPIRONOLACTONE 50 MG/1
TABLET, FILM COATED ORAL
COMMUNITY
Start: 2021-05-03 | End: 2021-05-13

## 2021-06-25 ENCOUNTER — OFFICE VISIT (OUTPATIENT)
Dept: PODIATRY | Age: 61
End: 2021-06-25
Payer: COMMERCIAL

## 2021-06-25 VITALS — HEIGHT: 62 IN | BODY MASS INDEX: 31.65 KG/M2 | WEIGHT: 172 LBS

## 2021-06-25 DIAGNOSIS — B07.0 PLANTAR VERRUCA: ICD-10-CM

## 2021-06-25 DIAGNOSIS — L84 CORNS AND CALLOSITIES: ICD-10-CM

## 2021-06-25 DIAGNOSIS — M79.671 PAIN IN RIGHT FOOT: Primary | ICD-10-CM

## 2021-06-25 PROCEDURE — 17110 DESTRUCTION B9 LES UP TO 14: CPT | Performed by: PODIATRIST

## 2021-06-25 NOTE — PROGRESS NOTES
follow-up and verruca vulgaris. Diagnoses and all orders for this visit:    Pain in right foot    Plantar verruca    Corns and callosities        Return in about 1 month (around 7/25/2021). Follow-up plantar verruca right foot    WART TX:   Verbal informed consent was obtained   #15 blade used to debride plantar verruca and sterile manner  Cryofreeze applied to plantar verruca. Patient tolerated well. Band-aid applied. Salinocaine dispensed. Apply once daily with band-aid as directed. The patient tolerated the procedure well and was given proper instruction for continued care. Padding and band-aid applied today remove tonight. Follow-up 1 month. Seen By:  Joan Johnston DPM      Document was created using voice recognition software. Note was reviewed, however may contain grammatical errors.

## 2021-07-14 ENCOUNTER — OFFICE VISIT (OUTPATIENT)
Dept: FAMILY MEDICINE CLINIC | Age: 61
End: 2021-07-14
Payer: COMMERCIAL

## 2021-07-14 VITALS
BODY MASS INDEX: 31.39 KG/M2 | WEIGHT: 170.6 LBS | TEMPERATURE: 96.9 F | HEIGHT: 62 IN | SYSTOLIC BLOOD PRESSURE: 120 MMHG | OXYGEN SATURATION: 98 % | HEART RATE: 71 BPM | DIASTOLIC BLOOD PRESSURE: 80 MMHG

## 2021-07-14 DIAGNOSIS — F41.9 ANXIETY: ICD-10-CM

## 2021-07-14 DIAGNOSIS — E53.8 VITAMIN B 12 DEFICIENCY: Primary | ICD-10-CM

## 2021-07-14 DIAGNOSIS — E83.52 HYPERCALCEMIA: ICD-10-CM

## 2021-07-14 DIAGNOSIS — I10 ESSENTIAL HYPERTENSION: ICD-10-CM

## 2021-07-14 PROCEDURE — 99396 PREV VISIT EST AGE 40-64: CPT | Performed by: INTERNAL MEDICINE

## 2021-07-14 RX ORDER — LISINOPRIL 5 MG/1
5 TABLET ORAL DAILY
Qty: 90 TABLET | Refills: 1 | Status: SHIPPED
Start: 2021-07-14 | End: 2021-07-14

## 2021-07-14 RX ORDER — LISINOPRIL 10 MG/1
10 TABLET ORAL DAILY
Qty: 90 TABLET | Refills: 1 | Status: CANCELLED | OUTPATIENT
Start: 2021-07-14

## 2021-07-14 RX ORDER — LISINOPRIL 5 MG/1
5 TABLET ORAL DAILY
COMMUNITY
End: 2021-07-14

## 2021-07-14 RX ORDER — LISINOPRIL 5 MG/1
5 TABLET ORAL DAILY
Qty: 90 TABLET | Refills: 1 | Status: SHIPPED
Start: 2021-07-14 | End: 2021-12-07 | Stop reason: SDUPTHER

## 2021-07-14 RX ORDER — SPIRONOLACTONE 50 MG/1
50 TABLET, FILM COATED ORAL DAILY
Qty: 90 TABLET | Refills: 1 | Status: SHIPPED
Start: 2021-07-14 | End: 2021-12-07 | Stop reason: SDUPTHER

## 2021-07-14 RX ORDER — SPIRONOLACTONE 50 MG/1
50 TABLET, FILM COATED ORAL DAILY
COMMUNITY
End: 2021-07-14

## 2021-07-14 RX ORDER — SPIRONOLACTONE 50 MG/1
50 TABLET, FILM COATED ORAL DAILY
Qty: 90 TABLET | Refills: 1 | Status: SHIPPED
Start: 2021-07-14 | End: 2021-07-14

## 2021-07-14 SDOH — ECONOMIC STABILITY: FOOD INSECURITY: WITHIN THE PAST 12 MONTHS, YOU WORRIED THAT YOUR FOOD WOULD RUN OUT BEFORE YOU GOT MONEY TO BUY MORE.: NEVER TRUE

## 2021-07-14 SDOH — ECONOMIC STABILITY: FOOD INSECURITY: WITHIN THE PAST 12 MONTHS, THE FOOD YOU BOUGHT JUST DIDN'T LAST AND YOU DIDN'T HAVE MONEY TO GET MORE.: NEVER TRUE

## 2021-07-14 ASSESSMENT — SOCIAL DETERMINANTS OF HEALTH (SDOH): HOW HARD IS IT FOR YOU TO PAY FOR THE VERY BASICS LIKE FOOD, HOUSING, MEDICAL CARE, AND HEATING?: NOT HARD AT ALL

## 2021-07-14 NOTE — PROGRESS NOTES
[address] [facility]     21  Adan Jeronimo : 1960 Sex: female  Age: 56 y.o. Chief Complaint   Patient presents with    Hypertension     6 months       HPI    Patient presents today for annual preventative complete exam.  In general she has been doing reasonably well. She did see gynecologist recently and had unremarkable exam.  She was started on spironolactone because of some hirsutism type symptoms. She took it for just a short period of time and stopped until she talk to me. She tells me blood pressure had dropped down to low 100s although she was not symptomatic with that. I did tell her it is okay to take this antibiotic cut her lisinopril back and we will need to watch her potassium as well. Her calcium levels have come down off of her Tums. She states she does take an occasional Pepcid for heartburn but it is very infrequent and states in the past month may be 2 episodes. I did review the upper GI testing done by ear nose and throat and it does show reflux and some minimal esophagitis type signs. Told her she is to let me know if these symptoms persist we might have to put her on something on a regular basis. Weight is similar. Review of Systems     Const: Denies chills, fever and sweats. Eyes: Denies eye symptoms. ENMT: Reports hearing difficulty and tinnitus of the right ear/improved Reports postnasal drip, but  denies other nasal symptoms. Denies mouth or throat symptoms. CV: Denies chest pain, orthopnea and palpitations. Resp: Denies cough, SOB and wheezing. GI: Denies diarrhea, nausea and vomiting. She has had reflux symptoms.-See above  : Urinary: denies dysuria, frequency and frequent UTI's. Musculo: Denies musculoskeletal symptoms. Skin: Denies eczema, pruritus and sores. Neuro: Denies dizziness, headache, seizures and syncope. Psych: Reports anxiety and stress//improved since [facility]            REST OF PERTINENT ROS GONE OVER AND WAS NEGATIVE. PMH:  Problem List: Essential hypertension  Health Maintenance:  Mammogram - (2017),   Mammogram Screening - (2017)  Colonoscopy - , -due 29  Pelvic/Pap Exam - gyn-  Rectal Exam - gyn-  Breast Exam - gyn declined  Mini Mental Status - -  EKG -   Medical Problems:  Hypertension, Depression, Right kidney removed, thyroid nodule removed, 2 c-sections, appy,  cholecystectomy  hysterectomy - bleeding  salivary duct stone surg, Anxiety  Reviewed and updated. FH:  Father:  John Olivo DOB 1960 Page #2  MI -  age 61. Mother:  Chronic Obstructive Pulmonary Disease (COPD)  Heart Disease -  age 67. Brother 1:  None. Brother 2:  None. Sister 1:  None. Sister 2:  None. Reviewed, no changes. 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 Alcohol: Occasionally consumes alcohol.                Current Outpatient Medications:     vitamin B-12 (CYANOCOBALAMIN) 1000 MCG tablet, Take by mouth, Disp: , Rfl:   No Known Allergies    Past Medical History:   Diagnosis Date    Anxiety 2019    Depression     Essential hypertension 2019     Past Surgical History:   Procedure Laterality Date    APPENDECTOMY       SECTION      x2    CHOLECYSTECTOMY      HYSTERECTOMY      KIDNEY REMOVAL Right      Family History   Problem Relation Age of Onset    COPD Mother     Heart Disease Mother     Heart Attack Father      Social History     Socioeconomic History    Marital status:      Spouse name: Not on file    Number of children: Not on file    Years of education: Not on file    Highest education level: Not on file   Occupational History    Not on file   Tobacco Use    Smoking status: Never Smoker    Smokeless tobacco: Never Used   Vaping Use    Vaping Use: Never used   Substance and Sexual Activity    Alcohol use: Yes     Comment: Occasionally    Drug use: Never    Sexual activity: Not on file   Other Topics Concern    Not on file   Social History Narrative    Not on file     Social Determinants of Health     Financial Resource Strain: Low Risk     Difficulty of Paying Living Expenses: Not hard at all   Food Insecurity: No Food Insecurity    Worried About Running Out of Food in the Last Year: Never true    [address] in the Last Year: Never true   Transportation Needs:     Lack of Transportation (Medical):  Lack of Transportation (Non-Medical):    Physical Activity:     Days of Exercise per Week:     Minutes of Exercise per Session:    Stress:     Feeling of Stress :    Social Connections:     Frequency of Communication with Friends and Family:     Frequency of Social Gatherings with Friends and Family:     Attends Christian Services:     Active Member of Clubs or Organizations:     Attends Club or Organization Meetings:     Marital Status:    Intimate Partner Violence:     Fear of Current or Ex-Partner:     Emotionally Abused:     Physically Abused:     Sexually Abused:        Vitals:    07/14/21 0812   BP: 120/80   Pulse: 71   Temp: 96.9 °F (36.1 °C)   TempSrc: Temporal   SpO2: 98%   Weight: 170 lb 9.6 oz (77.4 kg)   Height: 5' 2\" (1.575 m)       Physical Exam  Const: Appears well developed and well nourished. Appears overweight. No signs of acute  distress present. Eyes: EOMI in both eyes. PERRL. ENMT: . (External Ears) Tympanic membranes are intact. Neck: Supple and symmetric. Palpation reveals no adenopathy. No masses appreciated. Thyroid exhibits no nodule or thyromegaly. No JVD. Carotids: 2+ and equal bilaterally, without  bruits. Resp: No rales, rhonchi or wheezes appreciated over the lungs bilaterally. CV: Rate is regular. Rhythm is regular. S1 is normal. S2 is normal. No gallop or rubs. No  heart murmur appreciated. Extremities: No clubbing, cyanosis or edema. Abdomen: Bowel sounds are normoactive.  Palpation reveals softness, with no distension,  organomegaly or tenderness. No abdominal masses palpable. No palpable hepatosplenomegaly.   Psych: Patient's attitude is cooperative. Patient's affect is appropriate. Judgement is realistic. Insight is appropriate  Skin: Texture turgor and color okay. Assessment and Plan:  Angeline was seen today for hypertension. Diagnoses and all orders for this visit:    Vitamin B 12 deficiency  -     Cancel: VITAMIN B12; Future  -     VITAMIN B12; Future    Essential hypertension  -     Cancel: CBC Auto Differential; Future  -     Cancel: Comprehensive Metabolic Panel; Future  -     Cancel: Lipid Panel; Future  -     CBC Auto Differential; Future  -     Comprehensive Metabolic Panel; Future  -     Lipid Panel; Future    Hypercalcemia    Anxiety    Other orders  -     Discontinue: lisinopril (PRINIVIL;ZESTRIL) 5 MG tablet; Take 1 tablet by mouth daily  -     Discontinue: spironolactone (ALDACTONE) 50 MG tablet; Take 1 tablet by mouth daily    Plan: I will see her back in 6 months and as needed. Wrote for her spironolactone 50 mg daily. Decrease lisinopril to 5 mg daily. Blood work in 1 month. Will attempt to get note from gynecologist as well as their labs. Prescription management performed. Notify us with problems in the interim. Return in about 6 months (around 1/14/2022). Seen By:  Mala Rudolph MD      *Document was created using voice recognition software. Note was reviewed however may contain grammatical errors.

## 2021-07-26 ENCOUNTER — TELEPHONE (OUTPATIENT)
Dept: FAMILY MEDICINE CLINIC | Age: 61
End: 2021-07-26

## 2021-07-26 NOTE — TELEPHONE ENCOUNTER
----- Message from Khalif Sparks sent at 7/26/2021  7:37 AM EDT -----  Subject: Message to Provider    QUESTIONS  Information for Provider? Pt in on vacation in [place] and broke out   in [facility], went to the  and the Dr at the [facility] put her on a steroid and   advised her to stop taking the lisinopril. She does not feel good about   doing that, she would like to talk to the Dr asap.  ---------------------------------------------------------------------------  --------------  [address]  What is the best way for the office to contact you? OK to leave message on   voicemail  Preferred Call Back Phone Number? [phone]  ---------------------------------------------------------------------------  --------------  SCRIPT ANSWERS  Relationship to Patient?  Self

## 2021-07-26 NOTE — TELEPHONE ENCOUNTER
Have her to what the physician told her and stop the lisinopril for now. Monitor blood pressure closely. Get an appointment with me when she gets back to review.

## 2021-07-30 ENCOUNTER — OFFICE VISIT (OUTPATIENT)
Dept: PODIATRY | Age: 61
End: 2021-07-30
Payer: COMMERCIAL

## 2021-07-30 ENCOUNTER — TELEPHONE (OUTPATIENT)
Dept: FAMILY MEDICINE CLINIC | Age: 61
End: 2021-07-30

## 2021-07-30 VITALS
HEIGHT: 62 IN | BODY MASS INDEX: 31.28 KG/M2 | WEIGHT: 170 LBS | SYSTOLIC BLOOD PRESSURE: 141 MMHG | DIASTOLIC BLOOD PRESSURE: 86 MMHG

## 2021-07-30 DIAGNOSIS — L84 CORNS AND CALLOSITIES: ICD-10-CM

## 2021-07-30 DIAGNOSIS — M79.671 PAIN IN RIGHT FOOT: Primary | ICD-10-CM

## 2021-07-30 DIAGNOSIS — B07.0 PLANTAR VERRUCA: ICD-10-CM

## 2021-07-30 PROCEDURE — 99213 OFFICE O/P EST LOW 20 MIN: CPT | Performed by: PODIATRIST

## 2021-07-30 PROCEDURE — G8427 DOCREV CUR MEDS BY ELIG CLIN: HCPCS | Performed by: PODIATRIST

## 2021-07-30 PROCEDURE — 1036F TOBACCO NON-USER: CPT | Performed by: PODIATRIST

## 2021-07-30 PROCEDURE — G8417 CALC BMI ABV UP PARAM F/U: HCPCS | Performed by: PODIATRIST

## 2021-07-30 PROCEDURE — 3017F COLORECTAL CA SCREEN DOC REV: CPT | Performed by: PODIATRIST

## 2021-07-30 RX ORDER — METHYLPREDNISOLONE 4 MG/1
TABLET ORAL
COMMUNITY
Start: 2021-07-25 | End: 2021-08-02 | Stop reason: ALTCHOICE

## 2021-07-30 NOTE — PROGRESS NOTES
Patient in office to follow up with wart on bottom of right foot.     Romario Bosch : 1960 Sex: female  Age: 56 y.o. Patient was referred by Kelin Galeas MD    CC:    Follow-up multiple plantar wart right foot    HPI:   Follow-up multiple plantar wart right foot  Continued use of Salinocaine and Band-Aid once daily at night. Denies any significant pain today. Denies any open or draining wounds. No bleeding. No recent injury. No additional pedal complaints today. ROS:  Const: Denies constitutional symptoms  Musculo: Denies symptoms other than stated above  Skin: Denies symptoms other than stated above       Current Outpatient Medications:     methylPREDNISolone (MEDROL DOSEPACK) 4 MG tablet, TAKE 6 TABLETS ON DAY 1 AS DIRECTED ON PACKAGE AND DECREASE BY 1 TAB EACH DAY FOR A TOTAL OF 6 DAYS, Disp: , Rfl:     lisinopril (PRINIVIL;ZESTRIL) 5 MG tablet, Take 1 tablet by mouth daily, Disp: 90 tablet, Rfl: 1    spironolactone (ALDACTONE) 50 MG tablet, Take 1 tablet by mouth daily, Disp: 90 tablet, Rfl: 1    vitamin B-12 (CYANOCOBALAMIN) 1000 MCG tablet, Take by mouth, Disp: , Rfl:   No Known Allergies    Past Medical History:   Diagnosis Date    Anxiety 2019    Depression     Essential hypertension 2019           Vitals:    21 0906   BP: (!) 141/86   Site: Left Upper Arm   Position: Sitting   Weight: 170 lb (77.1 kg)   Height: 5' 2\" (1.575 m)       Work History/Social History:     Focused Lower Extremity Physical Exam:    Neurovascular examination:    Dorsalis Pedis palpable bilateral.  Posterior tibialis palpable bilateral.    Capillary Refill Time:  Immediate return  Hair growth:  Symmetrical and bilateral   Skin:  Not atrophic  Edema: No edema bilateral feet or ankles.   Neurologic:  Light touch intact bilateral.      Musculoskeletal/ Orthopedic examination:    Equinis: Absent bilateral  Dorsiflexion, plantarflexion, inversion, eversion bilateral 5 out of 5 muscle strength  Wiggling toes  Negative Homans  No significant tenderness to palpation plantar right foot    Dermatology examination:    Hyperkeratotic tissue plantar second, third and fourth metatarsal head right foot. After paring of hyperkeratotic tissue no bleeding noted. No plantar verruca noted today. Assessment and Plan:  Angeline was seen today for follow-up, foot pain and verruca vulgaris. Diagnoses and all orders for this visit:    Pain in right foot    Plantar verruca    Corns and callosities        Return in about 2 months (around 9/30/2021). Follow-up multiple plantar warts right foot  I did use #15 blade to pare hyperkeratotic tissue. No plantar verruca noted today. Transition to over-the-counter lotion and offloading padding. Any new skin lesions or abrasions any recurrence of plantar verruca come in sooner. I will follow-up 2 months to monitor progression. Seen By:  Sami Abbott DPM      Document was created using voice recognition software. Note was reviewed, however may contain grammatical errors.

## 2021-07-30 NOTE — TELEPHONE ENCOUNTER
Patient relayed to start taking Lisinopril if blood pressure continues to increase.  Patient stated she is going to take Lisinopril 5mg through the weekend, she has an appt on Monday, so we will see where her bp is standing then

## 2021-07-30 NOTE — TELEPHONE ENCOUNTER
Leona is still holding her Lisinopril and seeing her blood pressure numbers go up. Over the past 3 days she has had the following numbers in the mornings. Wed 140/81        Thurs 143/91    Today 142/81      She has an appointment today with Dr Tammy Albrecht and will have the nurse take her pressure again. Please advise if you want her to go back on the lisinopril.

## 2021-08-02 ENCOUNTER — OFFICE VISIT (OUTPATIENT)
Dept: FAMILY MEDICINE CLINIC | Age: 61
End: 2021-08-02
Payer: COMMERCIAL

## 2021-08-02 VITALS
TEMPERATURE: 97.2 F | OXYGEN SATURATION: 98 % | HEART RATE: 91 BPM | HEIGHT: 62 IN | SYSTOLIC BLOOD PRESSURE: 132 MMHG | WEIGHT: 170 LBS | BODY MASS INDEX: 31.28 KG/M2 | DIASTOLIC BLOOD PRESSURE: 74 MMHG

## 2021-08-02 DIAGNOSIS — E53.8 VITAMIN B 12 DEFICIENCY: ICD-10-CM

## 2021-08-02 DIAGNOSIS — I10 ESSENTIAL HYPERTENSION: Primary | ICD-10-CM

## 2021-08-02 DIAGNOSIS — L50.9 HIVES: ICD-10-CM

## 2021-08-02 DIAGNOSIS — L60.9 NAIL ABNORMALITY: ICD-10-CM

## 2021-08-02 PROCEDURE — 1036F TOBACCO NON-USER: CPT | Performed by: INTERNAL MEDICINE

## 2021-08-02 PROCEDURE — G8417 CALC BMI ABV UP PARAM F/U: HCPCS | Performed by: INTERNAL MEDICINE

## 2021-08-02 PROCEDURE — G8427 DOCREV CUR MEDS BY ELIG CLIN: HCPCS | Performed by: INTERNAL MEDICINE

## 2021-08-02 PROCEDURE — 99214 OFFICE O/P EST MOD 30 MIN: CPT | Performed by: INTERNAL MEDICINE

## 2021-08-02 PROCEDURE — 3017F COLORECTAL CA SCREEN DOC REV: CPT | Performed by: INTERNAL MEDICINE

## 2021-08-03 NOTE — PROGRESS NOTES
pruritus and sores. Recent hives-see above  Neuro: Denies dizziness, headache, seizures and syncope. Psych: Reports anxiety and stress//improved since correction            REST OF PERTINENT ROS GONE OVER AND WAS NEGATIVE. PMH:  Problem List: Essential hypertension  Health Maintenance:  Mammogram - (2017),   Mammogram Screening - (2017)  Colonoscopy - , -due 29  Pelvic/Pap Exam - gyn-  Rectal Exam - gyn-  Breast Exam - gyn declined  Mini Mental Status - -  EKG -   Medical Problems:  Hypertension, Depression, Right kidney removed, thyroid nodule removed, 2 c-sections, appy,  cholecystectomy  hysterectomy - bleeding  salivary duct stone surg, Anxiety  Reviewed and updated. FH:  Father:  Kavitha Jackson DOB 1958 Page #2  MI -  age 61. Mother:  Chronic Obstructive Pulmonary Disease (COPD)  Heart Disease -  age 67. Brother 1:  None. Brother 2:  None. Sister 1:  None. Sister 2:  None. Reviewed, no changes. 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 Alcohol: Occasionally consumes alcohol.             Current Outpatient Medications:     lisinopril (PRINIVIL;ZESTRIL) 5 MG tablet, Take 1 tablet by mouth daily, Disp: 90 tablet, Rfl: 1    spironolactone (ALDACTONE) 50 MG tablet, Take 1 tablet by mouth daily, Disp: 90 tablet, Rfl: 1    vitamin B-12 (CYANOCOBALAMIN) 1000 MCG tablet, Take by mouth, Disp: , Rfl:   No Known Allergies    Past Medical History:   Diagnosis Date    Anxiety 2019    Depression     Essential hypertension 2019     Past Surgical History:   Procedure Laterality Date    APPENDECTOMY       SECTION      x2    CHOLECYSTECTOMY      HYSTERECTOMY      KIDNEY REMOVAL Right      Family History   Problem Relation Age of Onset    COPD Mother     Heart Disease Mother     Heart Attack Father      Social History     Socioeconomic History    Marital status:  Spouse name: Not on file    Number of children: Not on file    Years of education: Not on file    Highest education level: Not on file   Occupational History    Not on file   Tobacco Use    Smoking status: Never Smoker    Smokeless tobacco: Never Used   Vaping Use    Vaping Use: Never used   Substance and Sexual Activity    Alcohol use: Yes     Comment: Occasionally    Drug use: Never    Sexual activity: Not on file   Other Topics Concern    Not on file   Social History Narrative    Not on file     Social Determinants of Health     Financial Resource Strain: Low Risk     Difficulty of Paying Living Expenses: Not hard at all   Food Insecurity: No Food Insecurity    Worried About Running Out of Food in the Last Year: Never true    [address] in the Last Year: Never true   Transportation Needs:     Lack of Transportation (Medical):  Lack of Transportation (Non-Medical):    Physical Activity:     Days of Exercise per Week:     Minutes of Exercise per Session:    Stress:     Feeling of Stress :    Social Connections:     Frequency of Communication with Friends and Family:     Frequency of Social Gatherings with Friends and Family:     Attends [employer] Services:     Active Member of Clubs or Organizations:     Attends Club or Organization Meetings:     Marital Status:    Intimate Partner Violence:     Fear of Current or Ex-Partner:     Emotionally Abused:     Physically Abused:     Sexually Abused:        Vitals:    08/02/21 1321   BP: 132/74   Pulse: 91   Temp: 97.2 °F (36.2 °C)   TempSrc: Temporal   SpO2: 98%   Weight: 170 lb (77.1 kg)   Height: 5' 2\" (1.575 m)       Physical Exam  Const: Appears well developed and well nourished. Appears overweight. No signs of acute  distress present. Neck: Supple and symmetric. Palpation reveals no adenopathy. No masses appreciated. Thyroid exhibits no nodule or thyromegaly. No JVD. Carotids: 2+ and equal bilaterally, without  bruits.   Resp: No rales, rhonchi or wheezes appreciated over the lungs bilaterally. CV: Rate is regular. Rhythm is regular. S1 is normal. S2 is normal. No gallop or rubs. No  heart murmur appreciated. Extremities: No clubbing, cyanosis or edema. Abdomen: Bowel sounds are normoactive. Palpation reveals softness, with no distension,  organomegaly or tenderness. No abdominal masses palpable. No palpable hepatosplenomegaly.   Psych: Patient's attitude is cooperative. Patient's affect is appropriate. Judgement is realistic. Insight is appropriate  Skin: Texture turgor and color okay. No rashes or hives noted at this time. Does show me nail abnormality on her left hand with whiteness/opacity to the distal ends of a couple nails. They look slightly raised. Other hand does not appear to be infected nor her toenails.           Assessment and Plan:  Angeline was seen today for urticaria. Diagnoses and all orders for this visit:    Essential hypertension  -     Comprehensive Metabolic Panel; Future  -     CBC Auto Differential; Future  -     Lipid Panel; Future  Stable on current antihypertensive medication. Vitamin B 12 deficiency  -     VITAMIN B12; Future  Stable on oral vitamin B12    Hives  Resolved and most likely related to recent sunscreen use. Nail abnormality  -     IRON AND TIBC; Future  -     FERRITIN; Future  Uncertain etiology-we will observe for now. Plan: She is to keep her next appointment. I told her I believe that this situation was related to the sunscreen as the hives occurred only at the areas where application of took place. I doubt this to be medication related without being more diffuse. She can stay on her lisinopril and restart her spironolactone. Blood work to monitor disease progression and medication use. When I see her next time look over 7/14/2021 note. We will observe the nails for the time being check iron level. Monitor blood pressure closely.   Notify me with problems in the

## 2021-09-02 ENCOUNTER — TELEPHONE (OUTPATIENT)
Dept: FAMILY MEDICINE CLINIC | Age: 61
End: 2021-09-02

## 2021-09-02 NOTE — TELEPHONE ENCOUNTER
Labs stable. Blood sugar does still remain in the prediabetic range.   Please put in note field to check hemoglobin A1c when I see her next

## 2021-10-01 ENCOUNTER — OFFICE VISIT (OUTPATIENT)
Dept: PODIATRY | Age: 61
End: 2021-10-01
Payer: COMMERCIAL

## 2021-10-01 VITALS — WEIGHT: 170 LBS | BODY MASS INDEX: 31.28 KG/M2 | HEIGHT: 62 IN

## 2021-10-01 DIAGNOSIS — B07.0 PLANTAR VERRUCA: Primary | ICD-10-CM

## 2021-10-01 DIAGNOSIS — L84 CORNS AND CALLOSITIES: ICD-10-CM

## 2021-10-01 DIAGNOSIS — M79.671 PAIN IN RIGHT FOOT: ICD-10-CM

## 2021-10-01 PROCEDURE — 17110 DESTRUCTION B9 LES UP TO 14: CPT | Performed by: PODIATRIST

## 2021-10-01 NOTE — PROGRESS NOTES
Patient in office to follow up with wart on bottom of right foot.     Igor Pals : 1960 Sex: female  Age: 59 y.o. Patient was referred by Kayy Olguin MD    CC:    Follow-up multiple plantar wart right foot    HPI:   Follow-up multiple plantar wart right foot  Has stopped using Salinocaine. Has noticed recurrence of plantar verruca. Decreased overall tenderness still has some tenderness where she has callus tissue on bottom both feet. No additional pedal complaints today. ROS:  Const: Denies constitutional symptoms  Musculo: Denies symptoms other than stated above  Skin: Denies symptoms other than stated above       Current Outpatient Medications:     lisinopril (PRINIVIL;ZESTRIL) 5 MG tablet, Take 1 tablet by mouth daily, Disp: 90 tablet, Rfl: 1    spironolactone (ALDACTONE) 50 MG tablet, Take 1 tablet by mouth daily, Disp: 90 tablet, Rfl: 1    vitamin B-12 (CYANOCOBALAMIN) 1000 MCG tablet, Take by mouth, Disp: , Rfl:   No Known Allergies    Past Medical History:   Diagnosis Date    Anxiety 2019    Depression     Essential hypertension 2019           Vitals:    10/01/21 0856   Weight: 170 lb (77.1 kg)   Height: 5' 2\" (1.575 m)       Work History/Social History:     Focused Lower Extremity Physical Exam:    Neurovascular examination:    Dorsalis Pedis palpable bilateral.  Posterior tibialis palpable bilateral.    Capillary Refill Time:  Immediate return  Hair growth:  Symmetrical and bilateral   Skin:  Not atrophic  Edema: No edema bilateral feet or ankles. Neurologic:  Light touch intact bilateral.      Musculoskeletal/ Orthopedic examination:    Equinis: Absent bilateral  Dorsiflexion, plantarflexion, inversion, eversion bilateral 5 out of 5 muscle strength  Wiggling toes  Negative Homans  Mild tenderness plantar right foot today where there is callus tissue and plantar verruca.     Dermatology examination:    Hyperkeratotic tissue plantar second, third and fourth metatarsal head right foot. Multiple black foci with bleeding. Consistent plantar verruca. Assessment and Plan:  Angeline was seen today for follow-up and verruca vulgaris. Diagnoses and all orders for this visit:    Plantar verruca    Pain in right foot    Corns and callosities        Return in about 1 month (around 11/1/2021). Follow-up multiple plantar warts right foot  I did use #15 blade to pare hyperkeratotic tissue does have recurrence of plantar verruca today. Did use cryo freeze today. Taught procedure well. We did dispense Salinocaine to be applied once daily with Band-Aid. We will follow-up 1 month. Seen By:  Marta Mensah DPM      Document was created using voice recognition software. Note was reviewed, however may contain grammatical errors.

## 2021-11-05 ENCOUNTER — OFFICE VISIT (OUTPATIENT)
Dept: PODIATRY | Age: 61
End: 2021-11-05
Payer: COMMERCIAL

## 2021-11-05 DIAGNOSIS — M79.671 PAIN IN RIGHT FOOT: ICD-10-CM

## 2021-11-05 DIAGNOSIS — B07.0 PLANTAR VERRUCA: Primary | ICD-10-CM

## 2021-11-05 DIAGNOSIS — L84 CORNS AND CALLOSITIES: ICD-10-CM

## 2021-11-05 PROCEDURE — 99213 OFFICE O/P EST LOW 20 MIN: CPT | Performed by: PODIATRIST

## 2021-11-05 PROCEDURE — 3017F COLORECTAL CA SCREEN DOC REV: CPT | Performed by: PODIATRIST

## 2021-11-05 PROCEDURE — 1036F TOBACCO NON-USER: CPT | Performed by: PODIATRIST

## 2021-11-05 PROCEDURE — G8484 FLU IMMUNIZE NO ADMIN: HCPCS | Performed by: PODIATRIST

## 2021-11-05 PROCEDURE — G8427 DOCREV CUR MEDS BY ELIG CLIN: HCPCS | Performed by: PODIATRIST

## 2021-11-05 PROCEDURE — G8417 CALC BMI ABV UP PARAM F/U: HCPCS | Performed by: PODIATRIST

## 2021-12-07 RX ORDER — LISINOPRIL 5 MG/1
5 TABLET ORAL DAILY
Qty: 90 TABLET | Refills: 0 | Status: SHIPPED
Start: 2021-12-07 | End: 2022-02-28 | Stop reason: SDUPTHER

## 2021-12-07 RX ORDER — SPIRONOLACTONE 50 MG/1
50 TABLET, FILM COATED ORAL DAILY
Qty: 90 TABLET | Refills: 0 | Status: SHIPPED
Start: 2021-12-07 | End: 2022-02-28 | Stop reason: SDUPTHER

## 2021-12-07 NOTE — TELEPHONE ENCOUNTER
Last Appointment:  8/2/2021  Future Appointments   Date Time Provider Heidi Link   1/10/2022  9:00 AM Vaughn Cornelius  [address] [place] [address]

## 2022-02-28 ENCOUNTER — OFFICE VISIT (OUTPATIENT)
Dept: FAMILY MEDICINE CLINIC | Age: 62
End: 2022-02-28
Payer: COMMERCIAL

## 2022-02-28 ENCOUNTER — TELEPHONE (OUTPATIENT)
Dept: FAMILY MEDICINE CLINIC | Age: 62
End: 2022-02-28

## 2022-02-28 VITALS
TEMPERATURE: 98.2 F | DIASTOLIC BLOOD PRESSURE: 80 MMHG | SYSTOLIC BLOOD PRESSURE: 126 MMHG | OXYGEN SATURATION: 99 % | WEIGHT: 168.2 LBS | BODY MASS INDEX: 30.95 KG/M2 | HEIGHT: 62 IN | HEART RATE: 92 BPM

## 2022-02-28 DIAGNOSIS — I10 ESSENTIAL HYPERTENSION: ICD-10-CM

## 2022-02-28 DIAGNOSIS — M25.531 RIGHT WRIST PAIN: ICD-10-CM

## 2022-02-28 DIAGNOSIS — R73.9 HYPERGLYCEMIA: ICD-10-CM

## 2022-02-28 DIAGNOSIS — K21.9 GASTROESOPHAGEAL REFLUX DISEASE, UNSPECIFIED WHETHER ESOPHAGITIS PRESENT: ICD-10-CM

## 2022-02-28 DIAGNOSIS — M79.641 RIGHT HAND PAIN: ICD-10-CM

## 2022-02-28 DIAGNOSIS — Z92.29 HISTORY OF REGULAR MEDICATION USE: ICD-10-CM

## 2022-02-28 LAB
ALBUMIN SERPL-MCNC: 4.7 G/DL (ref 3.5–5.2)
ALP BLD-CCNC: 63 U/L (ref 35–104)
ALT SERPL-CCNC: 20 U/L (ref 0–32)
ANION GAP SERPL CALCULATED.3IONS-SCNC: 11 MMOL/L (ref 7–16)
AST SERPL-CCNC: 15 U/L (ref 0–31)
BASOPHILS ABSOLUTE: 0.01 E9/L (ref 0–0.2)
BASOPHILS RELATIVE PERCENT: 0.2 % (ref 0–2)
BILIRUB SERPL-MCNC: 0.5 MG/DL (ref 0–1.2)
BUN BLDV-MCNC: 18 MG/DL (ref 6–23)
CALCIUM SERPL-MCNC: 9.9 MG/DL (ref 8.6–10.2)
CHLORIDE BLD-SCNC: 100 MMOL/L (ref 98–107)
CHOLESTEROL, TOTAL: 226 MG/DL (ref 0–199)
CO2: 26 MMOL/L (ref 22–29)
CREAT SERPL-MCNC: 0.9 MG/DL (ref 0.5–1)
EOSINOPHILS ABSOLUTE: 0.06 E9/L (ref 0.05–0.5)
EOSINOPHILS RELATIVE PERCENT: 1.2 % (ref 0–6)
GFR AFRICAN AMERICAN: >60
GFR NON-AFRICAN AMERICAN: >60 ML/MIN/1.73
GLUCOSE BLD-MCNC: 112 MG/DL (ref 74–99)
HBA1C MFR BLD: 5.6 % (ref 4–5.6)
HCT VFR BLD CALC: 44.1 % (ref 34–48)
HDLC SERPL-MCNC: 64 MG/DL
HEMOGLOBIN: 14.5 G/DL (ref 11.5–15.5)
IMMATURE GRANULOCYTES #: 0.01 E9/L
IMMATURE GRANULOCYTES %: 0.2 % (ref 0–5)
LDL CHOLESTEROL CALCULATED: 138 MG/DL (ref 0–99)
LYMPHOCYTES ABSOLUTE: 1.47 E9/L (ref 1.5–4)
LYMPHOCYTES RELATIVE PERCENT: 30.1 % (ref 20–42)
MCH RBC QN AUTO: 30.3 PG (ref 26–35)
MCHC RBC AUTO-ENTMCNC: 32.9 % (ref 32–34.5)
MCV RBC AUTO: 92.1 FL (ref 80–99.9)
MONOCYTES ABSOLUTE: 0.29 E9/L (ref 0.1–0.95)
MONOCYTES RELATIVE PERCENT: 5.9 % (ref 2–12)
NEUTROPHILS ABSOLUTE: 3.05 E9/L (ref 1.8–7.3)
NEUTROPHILS RELATIVE PERCENT: 62.4 % (ref 43–80)
PDW BLD-RTO: 12.9 FL (ref 11.5–15)
PLATELET # BLD: 230 E9/L (ref 130–450)
PMV BLD AUTO: 9.9 FL (ref 7–12)
POTASSIUM SERPL-SCNC: 4.5 MMOL/L (ref 3.5–5)
RBC # BLD: 4.79 E12/L (ref 3.5–5.5)
SODIUM BLD-SCNC: 137 MMOL/L (ref 132–146)
TOTAL PROTEIN: 7.8 G/DL (ref 6.4–8.3)
TRIGL SERPL-MCNC: 122 MG/DL (ref 0–149)
TSH SERPL DL<=0.05 MIU/L-ACNC: 3.61 UIU/ML (ref 0.27–4.2)
VLDLC SERPL CALC-MCNC: 24 MG/DL
WBC # BLD: 4.9 E9/L (ref 4.5–11.5)

## 2022-02-28 PROCEDURE — G8484 FLU IMMUNIZE NO ADMIN: HCPCS | Performed by: INTERNAL MEDICINE

## 2022-02-28 PROCEDURE — 99214 OFFICE O/P EST MOD 30 MIN: CPT | Performed by: INTERNAL MEDICINE

## 2022-02-28 PROCEDURE — G8417 CALC BMI ABV UP PARAM F/U: HCPCS | Performed by: INTERNAL MEDICINE

## 2022-02-28 PROCEDURE — G8427 DOCREV CUR MEDS BY ELIG CLIN: HCPCS | Performed by: INTERNAL MEDICINE

## 2022-02-28 PROCEDURE — 3017F COLORECTAL CA SCREEN DOC REV: CPT | Performed by: INTERNAL MEDICINE

## 2022-02-28 PROCEDURE — 1036F TOBACCO NON-USER: CPT | Performed by: INTERNAL MEDICINE

## 2022-02-28 RX ORDER — LISINOPRIL 5 MG/1
5 TABLET ORAL DAILY
Qty: 90 TABLET | Refills: 1 | Status: SHIPPED
Start: 2022-02-28 | End: 2022-08-29 | Stop reason: SDUPTHER

## 2022-02-28 RX ORDER — SPIRONOLACTONE 50 MG/1
50 TABLET, FILM COATED ORAL DAILY
Qty: 90 TABLET | Refills: 1 | Status: SHIPPED
Start: 2022-02-28 | End: 2022-08-29 | Stop reason: ALTCHOICE

## 2022-02-28 ASSESSMENT — PATIENT HEALTH QUESTIONNAIRE - PHQ9
SUM OF ALL RESPONSES TO PHQ9 QUESTIONS 1 & 2: 0
SUM OF ALL RESPONSES TO PHQ QUESTIONS 1-9: 0
1. LITTLE INTEREST OR PLEASURE IN DOING THINGS: 0
SUM OF ALL RESPONSES TO PHQ QUESTIONS 1-9: 0
2. FEELING DOWN, DEPRESSED OR HOPELESS: 0

## 2022-02-28 NOTE — PROGRESS NOTES
[address] [facility] [address] [facility]     22  Cameron Lundberg : 1960 Sex: female  Age: 59 y.o. Chief Complaint   Patient presents with    Hypertension     6 months, check A1C       HPI    Patient presents today for 6-month follow-up visit on her medical problems. Since I have seen her last she has had no further hives that she was complaining of last visit and it was felt that it was most likely sunscreen use that was causing this. She is doing better. The pressures been excellent. She is currently on lisinopril 5 mg and spironolactone whom she was placed on by her gynecologist for hirsutism type signs and symptoms. She did not really feel this is working well for her. I did ask her to speak with the gynecologist regarding this. We did decrease the dose of her lisinopril last visit as the pressure was getting a little bit low especially with addition of the spironolactone. I did ask her to continue monitoring pressure and should the gynecologist need to raise her spironolactone she needs to let us know as we may need to stop her lisinopril. Weight is down 2 pounds from last visit. She tells me GERD has been a little more active and she has had to take more Pepcid which she states is causing her to have diarrhea. States same thing happened with the Prilosec. She is asking about Tums. She did have an elevated calcium at one-point we felt was possibly related to the Tums however I told her if she is only taking 1 a day and not any other calcium supplement I think will be okay although we will have to monitor her levels. She did have some elevated sugars recently and I told her I am going to check an A1c with her blood work today. She also complains of right hand and wrist pain which she states she has had for a while primarily it sounds as though this is more in the median nerve distribution. She does get numbness and tingling and pain which goes up her arm.   I told her we would check x-rays today and get EMG/nerve conduction testing to rule out carpal tunnel. Review of Systems     Const: Denies chills, fever and sweats. Eyes: Denies eye symptoms. ENMT: Reports hearing difficulty and tinnitus of the right ear/improved Reports postnasal drip, but  denies other nasal symptoms. Denies mouth or throat symptoms. CV: Denies chest pain, orthopnea and palpitations. Resp: Denies cough, SOB and wheezing. GI: Denies diarrhea, nausea and vomiting.  She has had reflux symptoms.-See above  : Urinary: denies dysuria, frequency and frequent UTI's. Musculo: Right wrist and hand pain-see above  Skin: Denies eczema, pruritus and sores. Neuro: Denies dizziness, headache, seizures and syncope. Psych: Reports anxiety and stress//improved since [facility]                REST OF PERTINENT ROS GONE OVER AND WAS NEGATIVE. PMH:  Problem List: Essential hypertension  Health Maintenance:  Mammogram - (2017),   Mammogram Screening - (2017)  Colonoscopy - -due 29  Pelvic/Pap Exam - gyn-  Rectal Exam - gyn-  Breast Exam - gyn declined  Mini Mental Status - -  EKG -   Medical Problems:  Hypertension, Depression, Right kidney removed, thyroid nodule removed, 2 c-sections, appy,  cholecystectomy  hysterectomy - bleeding  salivary duct stone surg, Anxiety  Reviewed and updated. FH:  Father:  Clarisse STALLINGS 1960 Page #2  MI -  age 61. Mother:  Chronic Obstructive Pulmonary Disease (COPD)  Heart Disease -  age 67. Brother 1:  None. Brother 2:  None. Sister 1:  None. Sister 2:  None. Reviewed, no changes. 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 Alcohol: Occasionally consumes alcohol.                  Current Outpatient Medications:     lisinopril (PRINIVIL;ZESTRIL) 5 MG tablet, Take 1 tablet by mouth daily, Disp: 90 tablet, Rfl: 1    spironolactone (ALDACTONE) 50 MG tablet, Take 1 tablet by mouth daily, Disp: 90 tablet, Rfl: 1    vitamin B-12 (CYANOCOBALAMIN) 1000 MCG tablet, Take by mouth, Disp: , Rfl:   No Known Allergies    Past Medical History:   Diagnosis Date    Anxiety 2019    Depression     Essential hypertension 2019     Past Surgical History:   Procedure Laterality Date    APPENDECTOMY       SECTION      x2    CHOLECYSTECTOMY      HYSTERECTOMY      KIDNEY REMOVAL Right      Family History   Problem Relation Age of Onset    COPD Mother     Heart Disease Mother     Heart Attack Father      Social History     Socioeconomic History    Marital status:      Spouse name: Not on file    Number of children: Not on file    Years of education: Not on file    Highest education level: Not on file   Occupational History    Not on file   Tobacco Use    Smoking status: Never Smoker    Smokeless tobacco: Never Used   Vaping Use    Vaping Use: Never used   Substance and Sexual Activity    Alcohol use: Yes     Comment: Occasionally    Drug use: Never    Sexual activity: Not on file   Other Topics Concern    Not on file   Social History Narrative    Not on file     Social Determinants of Health     Financial Resource Strain: Low Risk     Difficulty of Paying Living Expenses: Not hard at all   Food Insecurity: No Food Insecurity    Worried About [address] in the Last Year: Never true    [facility] in the Last Year: Never true   Transportation Needs:     Lack of Transportation (Medical): Not on file    Lack of Transportation (Non-Medical):  Not on file   Physical Activity:     Days of Exercise per Week: Not on file    Minutes of Exercise per Session: Not on file   Stress:     Feeling of Stress : Not on file   Social Connections:     Frequency of Communication with Friends and Family: Not on file    Frequency of Social Gatherings with Friends and Family: Not on file    Attends Mandaeism Services: Not on file   [employer] of Clubs or Organizations: Not on file    Attends Club or Organization Meetings: Not on file    Marital Status: Not on file   Intimate Partner Violence:     Fear of Current or Ex-Partner: Not on file    Emotionally Abused: Not on file    Physically Abused: Not on file    Sexually Abused: Not on file   Housing Stability:     Unable to Pay for Housing in the Last Year: Not on file    Number of [place] in the Last Year: Not on file    Unstable Housing in the Last Year: Not on file       Vitals:    02/28/22 0853   BP: 126/80   Pulse: 92   Temp: 98.2 °F (36.8 °C)   TempSrc: Temporal   SpO2: 99%   Weight: 168 lb 3.2 oz (76.3 kg)   Height: 5' 2\" (1.575 m)       Physical Exam    Const: Appears well developed and well nourished. Appears overweight. No signs of acute  distress present. Eyes: EOMI in both eyes. PERRL. ENMT: . (External Ears) Tympanic membranes are intact. Neck: Supple and symmetric. Palpation reveals no adenopathy. No masses appreciated. Thyroid exhibits no nodule or thyromegaly. No JVD. Carotids: 2+ and equal bilaterally, without  bruits. Resp: No rales, rhonchi or wheezes appreciated over the lungs bilaterally. CV: Rate is regular. Rhythm is regular. S1 is normal. S2 is normal. No gallop or rubs. No  heart murmur appreciated. Extremities: No clubbing, cyanosis or edema. Abdomen: Bowel sounds are normoactive. Palpation reveals softness, with no distension,  organomegaly or tenderness. No abdominal masses palpable. No palpable hepatosplenomegaly.   Psych: Patient's attitude is cooperative. Patient's affect is appropriate. Judgement is realistic. Insight is appropriate  Skin: Texture turgor and color okay. Musculoskeletal: She does have good range of motion to wrist and hand. She does have some reproducible tenderness to palpation to the wrist joint area and the base of the thumb. .  Muscle strength and tone okay  Neurological: She does have negative Phalen's and Tinel's sign.   No reproducible loss of sensation to touch currently. No reproducible symptoms turning her head neck with range of motion. Assessment and Plan:  Angeline was seen today for hypertension. Diagnoses and all orders for this visit:    Essential hypertension  -     Comprehensive Metabolic Panel; Future  -     CBC with Auto Differential; Future  -     Lipid Panel; Future  Stable on current antihypertensive medication    Right hand pain  -     XR HAND RIGHT (MIN 3 VIEWS); Future  -     Nerve conduction test; Future  -     EMG; Future  -     TSH; Future    Right wrist pain  -     XR WRIST RIGHT (MIN 3 VIEWS); Future  -     Nerve conduction test; Future  -     EMG; Future  -     TSH; Future    Gastroesophageal reflux disease, unspecified whether esophagitis present  We will trial as needed Tums again. History of regular medication use    Hyperglycemia  -     Hemoglobin A1C; Future    Other orders  -     lisinopril (PRINIVIL;ZESTRIL) 5 MG tablet; Take 1 tablet by mouth daily  -     spironolactone (ALDACTONE) 50 MG tablet; Take 1 tablet by mouth daily    Plan: We will get x-ray of the hand and wrist as well as set up for EMG/nerve conduction testing. Blood work today to monitor disease progression and medication use. Follow-up with GYN. Continue to monitor blood pressure closely. Weight loss attempts. Notify us of problems in the interim. Return in about 1 month (around 3/28/2022). Seen By:  Milan Morales MD      *Document was created using voice recognition software. Note was reviewed however may contain grammatical errors.

## 2022-02-28 NOTE — TELEPHONE ENCOUNTER
Hand and wrist with multiple areas of osteoarthritis ors at the base of the thumb. Have her get her EMG/nerve conduction testing before we do anything else with this. Labs okay except for blood sugar again remain marginal at 112.   Hemoglobin A1c however was normal.

## 2022-03-18 ENCOUNTER — HOSPITAL ENCOUNTER (OUTPATIENT)
Dept: NEUROLOGY | Age: 62
Discharge: HOME | End: 2022-03-18
Payer: COMMERCIAL

## 2022-03-18 VITALS — HEIGHT: 62 IN | WEIGHT: 167 LBS | BODY MASS INDEX: 30.73 KG/M2

## 2022-03-18 DIAGNOSIS — M25.531 RIGHT WRIST PAIN: ICD-10-CM

## 2022-03-18 DIAGNOSIS — M79.641 RIGHT HAND PAIN: ICD-10-CM

## 2022-03-18 PROCEDURE — 95886 MUSC TEST DONE W/N TEST COMP: CPT

## 2022-03-18 PROCEDURE — 95909 NRV CNDJ TST 5-6 STUDIES: CPT | Performed by: PSYCHIATRY & NEUROLOGY

## 2022-03-18 PROCEDURE — 95886 MUSC TEST DONE W/N TEST COMP: CPT | Performed by: PSYCHIATRY & NEUROLOGY

## 2022-03-18 PROCEDURE — 95911 NRV CNDJ TEST 9-10 STUDIES: CPT

## 2022-03-18 NOTE — PROCEDURES
[facility]  22.   Electrodiagnostic Laboratory  [facility]        Full Name: Germania Client Gender: Female  MRN: [id number] YOB: 1960  Location[de-identified] Outpt. Visit Date: 3/18/2022 08:05  Age: 59 Years 11 Months Old  Examining Physician: Dr. Sendy Donaldson  Referring Physician: Dr. Olga Ruano  Technician: Piter Roe   Height: 5 feet 2 inch  Weight: 167 lbs  BMI: 30.6  Notes: Rt. hand pain M79.641; Rt. wrist pain M25.531        Motor NCS      Nerve / Sites Lat. Amplitude Distance Lat Diff Velocity Temp. Amp. 1-2    ms mV cm ms m/s °C %   R Median - APB      Wrist 3.44 8.8 8   33.4 100      Elbow 6.67 8.8 18 3.23 56 33.4 100   R Ulnar - ADM      Wrist 2.40 9.0 8   32.4 100      B. Elbow 5.21 9.0 16 2.81 57 32.5 99.7      A. Elbow 6.46 8.6 10 1.25 80 32.5 95.2       Sensory NCS      Nerve / Sites Onset Lat Peak Lat PP Amp Distance Velocity Temp.    ms ms µV cm m/s °C   R Median - Digit II (Antidromic)      Mid Palm 1.41 2.03 57.3 7 50 33      Wrist 2.81 3.59 52.3 14 50 33   R Ulnar - Digit V (Antidromic)      Wrist 2.19 2.97 55.5 14 64 32.9   R Radial - Anatomical snuff box (Forearm)      Forearm 1.61 2.08 32.6 10 62 35.4       Combined Sensory Index      Nerve / Sites Rec. Site Peak Lat NP Amp PP Amp Segments Peak Diff Temp. ms µV µV  ms °C   R Median - CSI      Median Thumb 2.81 43.6 51.9 Median - Radial 0.68 33.4      Radial Thumb 2.14 21.7 22.1 Median - Ulnar 0.57 33.3      Median Ring 3.39 31.1 40.7 Median palm - Ulnar palm 0.42 34      Ulnar Ring 2.81 27.5 38.1         Median palm Wrist 1.93 48.6 46.8         Ulnar palm Wrist 1.51 19.3 22.3         CSI     CSI 1.67          F  Wave      Nerve F Lat M Lat F-M Lat    ms ms ms   R Median - APB 25.2 3.5 21.7   R Ulnar - ADM 24.1 2.6 21.5       EMG         EMG Summary Table     Spontaneous MUAP Recruitment   Muscle IA Fib PSW Fasc H.F. Amp Dur. PPP Pattern   R. First dorsal interosseous N None None None None N N N N   R.  Abductor pollicis brevis N None None None None N N N N   R. Flexor pollicis longus N None None None None N N N N   R. Extensor indicis proprius N None None None None N N N N   R. Flexor digitorum profundus (Ulnar) N None None None None N N N N   R. Brachioradialis N None None None None N N N N   R. Biceps brachii N None None None None N N N N   R. Triceps brachii N None None None None N N N N   R. Deltoid N None None None None N N N N   R. Cervical paraspinals (low) N None None None None N N N N   R. Cervical paraspinals (mid) N None None None None N N N N           Nerve conduction studies in the right arm revealed minimally increased median nerve CSI values. The left median nerve F wave latency was also slightly delayed. These findings were compared to the referential values in this laboratory, available upon request.    Monopolar needle examination of the left arm and paraspinals was unremarkable. Electrodiagnostic examination of the right arm disclosed evidence diagnostic of a right median neuropathy at/or distal to the wrist, of minimal severity. These findings were consistent with carpal tunnel syndrome. There were no other peripheral neuropathies. There were no motor radiculopathies or intracanalicular lesions. Sensory radiculopathies cannot be evaluated by electrodiagnostic means. Clinically, the patient presented with tingling sensations in the first 3 digits of the right hand, especially at night. She suffered a severe dog bite of the right forearm months ago, recovering well. On brief neurological examination, I found no motor or sensory compromise. Her difficulties are related to her minimal right carpal tunnel syndrome. Fortunately, there were no neuropathic injuries from the dog bite. Clinical correlation was highly advised.

## 2022-03-21 ENCOUNTER — TELEPHONE (OUTPATIENT)
Dept: FAMILY MEDICINE CLINIC | Age: 62
End: 2022-03-21

## 2022-03-30 ENCOUNTER — OFFICE VISIT (OUTPATIENT)
Dept: FAMILY MEDICINE CLINIC | Age: 62
End: 2022-03-30
Payer: COMMERCIAL

## 2022-03-30 VITALS
DIASTOLIC BLOOD PRESSURE: 76 MMHG | WEIGHT: 173 LBS | OXYGEN SATURATION: 98 % | SYSTOLIC BLOOD PRESSURE: 130 MMHG | TEMPERATURE: 97.2 F | HEIGHT: 62 IN | BODY MASS INDEX: 31.83 KG/M2 | HEART RATE: 75 BPM

## 2022-03-30 DIAGNOSIS — G56.01 CARPAL TUNNEL SYNDROME OF RIGHT WRIST: ICD-10-CM

## 2022-03-30 DIAGNOSIS — M79.641 RIGHT HAND PAIN: Primary | ICD-10-CM

## 2022-03-30 DIAGNOSIS — I10 ESSENTIAL HYPERTENSION: ICD-10-CM

## 2022-03-30 PROCEDURE — G8417 CALC BMI ABV UP PARAM F/U: HCPCS | Performed by: INTERNAL MEDICINE

## 2022-03-30 PROCEDURE — 3017F COLORECTAL CA SCREEN DOC REV: CPT | Performed by: INTERNAL MEDICINE

## 2022-03-30 PROCEDURE — G8427 DOCREV CUR MEDS BY ELIG CLIN: HCPCS | Performed by: INTERNAL MEDICINE

## 2022-03-30 PROCEDURE — 99213 OFFICE O/P EST LOW 20 MIN: CPT | Performed by: INTERNAL MEDICINE

## 2022-03-30 PROCEDURE — 1036F TOBACCO NON-USER: CPT | Performed by: INTERNAL MEDICINE

## 2022-03-30 PROCEDURE — G8484 FLU IMMUNIZE NO ADMIN: HCPCS | Performed by: INTERNAL MEDICINE

## 2022-03-30 NOTE — PROGRESS NOTES
[address] [facility] [address] [facility]     3/30/22  Nila Ramires : 1960 Sex: female  Age: 59 y.o. Chief Complaint   Patient presents with    1 Month Follow-Up     discuss EMG/NCT       HPI    Patient presents today for short-term follow-up visit post EMG nerve conduction testing for right wrist pain and hand tingling that upon testing was felt to be mild carpal tunnel syndrome related. I did review the testing as well as the hand and wrist x-rays demonstrating osteoarthritis. .  I did give patient options regarding a carpal tunnel cock-up splint to be used at night versus possibly having her seen and injected. She states is really not bad enough to go any place would like to try the splint. Blood pressures been stable and controlled on her antihypertensive medication. Review of Systems     Const: Denies chills, fever and sweats. Musculo: Right wrist and hand pain-see above  Skin: Denies eczema, pruritus and sores. Neuro: Denies dizziness, headache, seizures and syncope. Does get occasional tingling to median nerve distribution right hand primarily at night  Psych: Reports anxiety and stress//improved since [facility]            REST OF PERTINENT ROS GONE OVER AND WAS NEGATIVE. Current Outpatient Medications:     Elastic Bandages & Supports (WRIST SPLINT/COCK-UP/RIGHT M) MISC, Place on before going to bed. may take off in the morning.   For carpal tunnel syndrome, Disp: 1 each, Rfl: 0    lisinopril (PRINIVIL;ZESTRIL) 5 MG tablet, Take 1 tablet by mouth daily, Disp: 90 tablet, Rfl: 1    spironolactone (ALDACTONE) 50 MG tablet, Take 1 tablet by mouth daily, Disp: 90 tablet, Rfl: 1    vitamin B-12 (CYANOCOBALAMIN) 1000 MCG tablet, Take by mouth, Disp: , Rfl:   No Known Allergies    Past Medical History:   Diagnosis Date    Anxiety 2019    Depression     Essential hypertension 2019     Past Surgical History:   Procedure Laterality Date    APPENDECTOMY       SECTION Last Year: Not on file    Number of Places Lived in the Last Year: Not on file    Unstable Housing in the Last Year: Not on file       Vitals:    03/30/22 0949   BP: 130/76   Pulse: 75   Temp: 97.2 °F (36.2 °C)   TempSrc: Temporal   SpO2: 98%   Weight: 173 lb (78.5 kg)   Height: 5' 2\" (1.575 m)       Physical Exam    Const: Appears well developed and well nourished. Appears overweight. No signs of acute  distress present. Musculoskeletal: She does have good range of motion to wrist and hand. She does have some reproducible tenderness to palpation to the wrist joint area and the base of the thumb. .  Muscle strength and tone okay  Neurological: She does have negative Phalen's and Tinel's sign. No reproducible loss of sensation to touch currently. Assessment and Plan:  Angeline was seen today for 1 month follow-up. Diagnoses and all orders for this visit:    Right hand pain    Carpal tunnel syndrome of right wrist  -     Discontinue: Elastic Bandages & Supports (WRIST SPLINT/COCK-UP/RIGHT M) MISC; Place on before going to bed. may take off in the morning. For carpal tunnel syndrome  -     Elastic Bandages & Supports (WRIST SPLINT/COCK-UP/RIGHT M) MISC; Place on before going to bed. may take off in the morning. For carpal tunnel syndrome    Essential hypertension      Plan: See above body report. We will give order for carpal tunnel cock-up splint to be used at night. Recommended anti-inflammatory medication for arthritic pain. Follow-up in 5 months regular visit. Notify us if not improving. Continue to monitor blood pressure closely. Return in about 5 months (around 8/30/2022). Seen By:  Josselyn Mcdaniels MD      *Document was created using voice recognition software. Note was reviewed however may contain grammatical errors.

## 2022-05-02 ENCOUNTER — TELEPHONE (OUTPATIENT)
Dept: FAMILY MEDICINE CLINIC | Age: 62
End: 2022-05-02

## 2022-05-02 NOTE — TELEPHONE ENCOUNTER
Nehal calling in her dermatologist has given her ok to stop spirolactone.  She stated you told her to contact you once stopped, to adjust bp med

## 2022-05-02 NOTE — TELEPHONE ENCOUNTER
Before making changes I would simply monitor blood pressure closely over the next 2 weeks off of the spironolactone and call in numbers.

## 2022-08-29 ENCOUNTER — OFFICE VISIT (OUTPATIENT)
Dept: FAMILY MEDICINE CLINIC | Age: 62
End: 2022-08-29
Payer: COMMERCIAL

## 2022-08-29 VITALS
BODY MASS INDEX: 31.98 KG/M2 | HEIGHT: 62 IN | OXYGEN SATURATION: 97 % | DIASTOLIC BLOOD PRESSURE: 72 MMHG | TEMPERATURE: 97.9 F | WEIGHT: 173.8 LBS | HEART RATE: 82 BPM | SYSTOLIC BLOOD PRESSURE: 128 MMHG

## 2022-08-29 DIAGNOSIS — R73.9 HYPERGLYCEMIA: ICD-10-CM

## 2022-08-29 DIAGNOSIS — I10 ESSENTIAL HYPERTENSION: Primary | ICD-10-CM

## 2022-08-29 DIAGNOSIS — E53.8 VITAMIN B 12 DEFICIENCY: ICD-10-CM

## 2022-08-29 DIAGNOSIS — G56.01 CARPAL TUNNEL SYNDROME OF RIGHT WRIST: ICD-10-CM

## 2022-08-29 LAB — HBA1C MFR BLD: 5.7 % (ref 4–5.6)

## 2022-08-29 PROCEDURE — 1036F TOBACCO NON-USER: CPT | Performed by: INTERNAL MEDICINE

## 2022-08-29 PROCEDURE — G8427 DOCREV CUR MEDS BY ELIG CLIN: HCPCS | Performed by: INTERNAL MEDICINE

## 2022-08-29 PROCEDURE — G8417 CALC BMI ABV UP PARAM F/U: HCPCS | Performed by: INTERNAL MEDICINE

## 2022-08-29 PROCEDURE — 99213 OFFICE O/P EST LOW 20 MIN: CPT | Performed by: INTERNAL MEDICINE

## 2022-08-29 PROCEDURE — 3017F COLORECTAL CA SCREEN DOC REV: CPT | Performed by: INTERNAL MEDICINE

## 2022-08-29 RX ORDER — TRETINOIN 0.5 MG/G
CREAM TOPICAL NIGHTLY
COMMUNITY

## 2022-08-29 RX ORDER — LISINOPRIL 5 MG/1
5 TABLET ORAL DAILY
Qty: 90 TABLET | Refills: 1 | Status: SHIPPED | OUTPATIENT
Start: 2022-08-29

## 2022-08-29 RX ORDER — CLINDAMYCIN PHOSPHATE 11.9 MG/ML
SOLUTION TOPICAL 2 TIMES DAILY
COMMUNITY

## 2022-08-29 RX ORDER — SPIRONOLACTONE 50 MG/1
50 TABLET, FILM COATED ORAL DAILY
Qty: 90 TABLET | Refills: 1 | Status: CANCELLED | OUTPATIENT
Start: 2022-08-29

## 2022-08-29 SDOH — ECONOMIC STABILITY: FOOD INSECURITY: WITHIN THE PAST 12 MONTHS, THE FOOD YOU BOUGHT JUST DIDN'T LAST AND YOU DIDN'T HAVE MONEY TO GET MORE.: NEVER TRUE

## 2022-08-29 SDOH — ECONOMIC STABILITY: FOOD INSECURITY: WITHIN THE PAST 12 MONTHS, YOU WORRIED THAT YOUR FOOD WOULD RUN OUT BEFORE YOU GOT MONEY TO BUY MORE.: NEVER TRUE

## 2022-08-29 ASSESSMENT — SOCIAL DETERMINANTS OF HEALTH (SDOH): HOW HARD IS IT FOR YOU TO PAY FOR THE VERY BASICS LIKE FOOD, HOUSING, MEDICAL CARE, AND HEATING?: NOT HARD AT ALL

## 2022-08-29 NOTE — PROGRESS NOTES
[address] [place]     22  Scott Dong : 1960 Sex: female  Age: 59 y.o. Chief Complaint   Patient presents with    Hypertension     5 months       HPI  Patient presents today for follow-up visit on her medical problems. Reviewed my last note. She has been doing well since have seen her last she states. Blood pressure has been excellent on her current antihypertensive medication. Her right sided carpal tunnel has been stable and does not wish anything else done with it currently. We did discuss her prediabetic blood sugars. Going to check another A1c on her today. States she is up-to-date with her gynecologist.  Weight is up 6 pounds  Using as needed Tums for reflux which is very infrequent. Follows with GYN and gastroenterology. Review of Systems    Const: Denies chills, fever and sweats. Eyes: Denies eye symptoms. ENMT: Reports hearing difficulty and tinnitus of the right ear/improved Reports postnasal drip, but  denies other nasal symptoms. Denies mouth or throat symptoms. CV: Denies chest pain, orthopnea and palpitations. Resp: Denies cough, SOB and wheezing. GI: Denies diarrhea, nausea and vomiting. She has had reflux symptoms.-See above  : Urinary: denies dysuria, frequency and frequent UTI's. Musculo: Occasional right wrist and hand pain-see above  Skin: Denies eczema, pruritus and sores. Neuro: Denies dizziness, headache, seizures and syncope. Psych: Reports anxiety and stress//improved since alf                 REST OF PERTINENT ROS GONE OVER AND WAS NEGATIVE.      PMH:  Problem List: Essential hypertension  Health Maintenance:  Mammogram - (2017),   Mammogram Screening - (2017)  Colonoscopy - , -due 29  Pelvic/Pap Exam - gyn-  Rectal Exam - gyn-  Breast Exam - gyn declined  Mini Mental Status - -  EKG -   Medical Problems:  Hypertension, Depression, Right kidney removed, thyroid nodule removed, 2 c-sections, appy,  cholecystectomy  hysterectomy - bleeding  salivary duct stone surg, Anxiety  Reviewed and updated. FH:  Father:  Devyn Hicks  1960 Page #2  MI -  age 61. Mother:  Chronic Obstructive Pulmonary Disease (COPD)  Heart Disease -  age 67. Brother 1:  None. Brother 2:  None. Sister 1:  None. Sister 2:  None. Reviewed, no changes. 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 Alcohol: Occasionally consumes alcohol. Current Outpatient Medications:     lisinopril (PRINIVIL;ZESTRIL) 5 MG tablet, Take 1 tablet by mouth daily, Disp: 90 tablet, Rfl: 1    tretinoin (RETIN-A) 0.05 % cream, Apply topically nightly Apply topically nightly., Disp: , Rfl:     clindamycin (CLEOCIN-T) 1 % external solution, Apply topically 2 times daily Apply topically 2 times daily. , Disp: , Rfl:     Elastic Bandages & Supports (WRIST SPLINT/COCK-UP/RIGHT M) MISC, Place on before going to bed. may take off in the morning.   For carpal tunnel syndrome, Disp: 1 each, Rfl: 0    vitamin B-12 (CYANOCOBALAMIN) 1000 MCG tablet, Take by mouth, Disp: , Rfl:   No Known Allergies    Past Medical History:   Diagnosis Date    Anxiety 2019    Depression     Essential hypertension 2019     Past Surgical History:   Procedure Laterality Date    APPENDECTOMY       SECTION      x2    CHOLECYSTECTOMY      HYSTERECTOMY (CERVIX STATUS UNKNOWN)      KIDNEY REMOVAL Right      Family History   Problem Relation Age of Onset    COPD Mother     Heart Disease Mother     Heart Attack Father      Social History     Socioeconomic History    Marital status:      Spouse name: Not on file    Number of children: Not on file    Years of education: Not on file    Highest education level: Not on file   Occupational History    Not on file   Tobacco Use    Smoking status: Never    Smokeless tobacco: Never   Vaping Use    Vaping Use: Never used Substance and Sexual Activity    Alcohol use: Yes     Comment: Occasionally    Drug use: Never    Sexual activity: Not on file   Other Topics Concern    Not on file   Social History Narrative    Not on file     Social Determinants of Health     Financial Resource Strain: Low Risk     Difficulty of Paying Living Expenses: Not hard at all   Food Insecurity: No Food Insecurity    Worried About Running Out of Food in the Last Year: Never true    Ran Out of Food in the Last Year: Never true   Transportation Needs: Not on file   Physical Activity: Not on file   Stress: Not on file   Social Connections: Not on file   Intimate Partner Violence: Not on file   Housing Stability: Not on file       Vitals:    08/29/22 0941   BP: 128/72   Pulse: 82   Temp: 97.9 °F (36.6 °C)   TempSrc: Temporal   SpO2: 97%   Weight: 173 lb 12.8 oz (78.8 kg)   Height: 5' 2\" (1.575 m)       Physical Exam    Const: Appears well developed and well nourished. Appears overweight. No signs of acute  distress present. Neck: Supple and symmetric. Palpation reveals no adenopathy. No masses appreciated. Thyroid exhibits no nodule or thyromegaly. No JVD. Carotids: 2+ and equal bilaterally, without  bruits. Resp: No rales, rhonchi or wheezes appreciated over the lungs bilaterally. CV: Rate is regular. Rhythm is regular. S1 is normal. S2 is normal. No gallop or rubs. No  heart murmur appreciated. Extremities: No clubbing, cyanosis or edema. Abdomen: Bowel sounds are normoactive. Palpation reveals softness, with no distension,  organomegaly or tenderness. No abdominal masses palpable. No palpable hepatosplenomegaly. Psych: Patient's attitude is cooperative. Patient's affect is appropriate. Judgement is realistic. Insight is appropriate  Skin: Texture turgor and color okay. Assessment and Plan:  Angeline was seen today for hypertension.     Diagnoses and all orders for this visit:    Essential hypertension    Carpal tunnel syndrome of right wrist    Hyperglycemia  -     Hemoglobin A1C; Future    Vitamin B 12 deficiency  -     Vitamin B12; Future    Other orders  -     lisinopril (PRINIVIL;ZESTRIL) 5 MG tablet; Take 1 tablet by mouth daily    Plan: We will set her up with new physician in 6 months for follow-up. Ask her to fast at that visit. I will check hemoglobin A1c and vitamin D B12 level. She did see GI since have seen her last but I do not have their notes. She did show me some the labs and plain film x-ray that they did. They felt that she is constipated with overflow diarrhea. They did ask her to use MiraLAX and stool softener to get a cleanout. She is going to follow-up with them. Continue to monitor blood pressure closely. Notify us of problems in the interim    Return in about 6 months (around 2/28/2023). Seen By:  Lesly Bearden MD      *Document was created using voice recognition software. Note was reviewed however may contain grammatical errors.

## 2022-08-30 ENCOUNTER — TELEPHONE (OUTPATIENT)
Dept: FAMILY MEDICINE CLINIC | Age: 62
End: 2022-08-30

## 2022-08-30 LAB — VITAMIN B-12: 977 PG/ML (ref 211–946)

## 2022-08-30 NOTE — LETTER
[address] [place] [address] Drive  [address] [place], [place] [id number]  Phone: [phone]  Fax: [phone]    Ishmael Velasquez MD        August 30, 2022     [address] [place] [address]      Dear Ara Baltazar:    Below are the results from your recent visit:    Resulted Orders   Vitamin B12   Result Value Ref Range    Vitamin B-12 977 (H) 211 - 946 pg/mL   Hemoglobin A1C   Result Value Ref Range    Hemoglobin A1C 5.7 (H) 4.0 - 5.6 %     B12 and hemoglobin A1c were okay. If you have any questions or concerns, please don't hesitate to call.     Sincerely,        Ishmael Velasquez MD

## 2023-02-02 ENCOUNTER — OFFICE VISIT (OUTPATIENT)
Dept: FAMILY MEDICINE CLINIC | Age: 63
End: 2023-02-02
Payer: COMMERCIAL

## 2023-02-02 VITALS
HEART RATE: 69 BPM | BODY MASS INDEX: 31.47 KG/M2 | SYSTOLIC BLOOD PRESSURE: 128 MMHG | RESPIRATION RATE: 15 BRPM | DIASTOLIC BLOOD PRESSURE: 82 MMHG | TEMPERATURE: 96.5 F | OXYGEN SATURATION: 99 % | HEIGHT: 62 IN | WEIGHT: 171 LBS

## 2023-02-02 DIAGNOSIS — E53.8 VITAMIN B 12 DEFICIENCY: ICD-10-CM

## 2023-02-02 DIAGNOSIS — Z12.31 SCREENING MAMMOGRAM, ENCOUNTER FOR: ICD-10-CM

## 2023-02-02 DIAGNOSIS — R73.9 HYPERGLYCEMIA: ICD-10-CM

## 2023-02-02 DIAGNOSIS — I10 ESSENTIAL HYPERTENSION, BENIGN: Primary | ICD-10-CM

## 2023-02-02 DIAGNOSIS — Z00.00 PREVENTATIVE HEALTH CARE: ICD-10-CM

## 2023-02-02 PROCEDURE — 3017F COLORECTAL CA SCREEN DOC REV: CPT | Performed by: FAMILY MEDICINE

## 2023-02-02 PROCEDURE — 3074F SYST BP LT 130 MM HG: CPT | Performed by: FAMILY MEDICINE

## 2023-02-02 PROCEDURE — G8484 FLU IMMUNIZE NO ADMIN: HCPCS | Performed by: FAMILY MEDICINE

## 2023-02-02 PROCEDURE — G8427 DOCREV CUR MEDS BY ELIG CLIN: HCPCS | Performed by: FAMILY MEDICINE

## 2023-02-02 PROCEDURE — G8417 CALC BMI ABV UP PARAM F/U: HCPCS | Performed by: FAMILY MEDICINE

## 2023-02-02 PROCEDURE — 1036F TOBACCO NON-USER: CPT | Performed by: FAMILY MEDICINE

## 2023-02-02 PROCEDURE — 99214 OFFICE O/P EST MOD 30 MIN: CPT | Performed by: FAMILY MEDICINE

## 2023-02-02 PROCEDURE — 3079F DIAST BP 80-89 MM HG: CPT | Performed by: FAMILY MEDICINE

## 2023-02-02 RX ORDER — DICYCLOMINE HYDROCHLORIDE 10 MG/1
10 CAPSULE ORAL
COMMUNITY

## 2023-02-02 RX ORDER — LISINOPRIL 5 MG/1
5 TABLET ORAL DAILY
Qty: 90 TABLET | Refills: 1 | Status: SHIPPED | OUTPATIENT
Start: 2023-02-02

## 2023-02-02 ASSESSMENT — PATIENT HEALTH QUESTIONNAIRE - PHQ9
SUM OF ALL RESPONSES TO PHQ9 QUESTIONS 1 & 2: 1
1. LITTLE INTEREST OR PLEASURE IN DOING THINGS: 0
SUM OF ALL RESPONSES TO PHQ QUESTIONS 1-9: 1
2. FEELING DOWN, DEPRESSED OR HOPELESS: 1
SUM OF ALL RESPONSES TO PHQ QUESTIONS 1-9: 1

## 2023-02-02 NOTE — PROGRESS NOTES
[address] [place] [address] presents to the office today for   Chief Complaint   Patient presents with    Mendel Tristan  Dermatologist Dr. [facility]    Previous PCP Dr. Cathryn Spence    Hypertension  Taking medication  Controlled  No chest pain or dyspnea      2 daughters, 2 step children  12 grandchildren  6 great grandchildren    Review of Systems     /82   Pulse 69   Temp (!) 96.5 °F (35.8 °C) (Temporal)   Resp 15   Ht 5' 2\" (1.575 m)   Wt 171 lb (77.6 kg)   SpO2 99%   BMI 31.28 kg/m²   Physical Exam  Constitutional:       Appearance: Normal appearance. HENT:      Head: Normocephalic and atraumatic. Eyes:      Extraocular Movements: Extraocular movements intact. Conjunctiva/sclera: Conjunctivae normal.   Cardiovascular:      Rate and Rhythm: Normal rate. Heart sounds: Normal heart sounds. Pulmonary:      Effort: Pulmonary effort is normal.      Breath sounds: Normal breath sounds. Skin:     General: Skin is warm. Neurological:      Mental Status: She is alert and oriented to person, place, and time. Psychiatric:         Mood and Affect: Mood normal.         Behavior: Behavior normal.          Current Outpatient Medications:     dicyclomine (BENTYL) 10 MG capsule, Take 10 mg by mouth 4 times daily (before meals and nightly), Disp: , Rfl:     lisinopril (PRINIVIL;ZESTRIL) 5 MG tablet, Take 1 tablet by mouth daily, Disp: 90 tablet, Rfl: 1    tretinoin (RETIN-A) 0.05 % cream, Apply topically nightly Apply topically nightly., Disp: , Rfl:     clindamycin (CLEOCIN T) 1 % external solution, Apply topically 2 times daily Apply topically 2 times daily. , Disp: , Rfl:     Elastic Bandages & Supports (WRIST SPLINT/COCK-UP/RIGHT M) MISC, Place on before going to bed. may take off in the morning.   For carpal tunnel syndrome, Disp: 1 each, Rfl: 0    vitamin B-12 (CYANOCOBALAMIN) 1000 MCG tablet, Take by mouth, Disp: , Rfl:      Past Medical History: Diagnosis Date    Anxiety 7/31/2019    Depression     Essential hypertension 7/31/2019       Angeline was seen today for established new doctor. Diagnoses and all orders for this visit:    Essential hypertension, benign  -     Lipid Panel; Future  -     Comprehensive Metabolic Panel; Future    Screening mammogram, encounter for  -     [facility] DIGITAL SCREEN BILATERAL PER PROTOCOL; Future    Hyperglycemia  -     Hemoglobin A1C; Future    Vitamin B 12 deficiency  -     Vitamin B12; Future    Preventative health care  -     Lipid Panel; Future  -     Comprehensive Metabolic Panel; Future  -     CBC with Auto Differential; Future  -     TSH; Future    Other orders  -     lisinopril (PRINIVIL;ZESTRIL) 5 MG tablet;  Take 1 tablet by mouth daily     Recheck 7 months    Surjit Cadet MD

## 2023-03-17 DIAGNOSIS — I10 ESSENTIAL HYPERTENSION, BENIGN: ICD-10-CM

## 2023-03-17 DIAGNOSIS — Z00.00 PREVENTATIVE HEALTH CARE: ICD-10-CM

## 2023-03-17 DIAGNOSIS — R73.9 HYPERGLYCEMIA: ICD-10-CM

## 2023-03-17 DIAGNOSIS — E53.8 VITAMIN B 12 DEFICIENCY: ICD-10-CM

## 2023-03-17 LAB
ALBUMIN SERPL-MCNC: 4.5 G/DL (ref 3.5–5.2)
ALP SERPL-CCNC: 58 U/L (ref 35–104)
ALT SERPL-CCNC: 19 U/L (ref 0–32)
ANION GAP SERPL CALCULATED.3IONS-SCNC: 16 MMOL/L (ref 7–16)
AST SERPL-CCNC: 18 U/L (ref 0–31)
BASOPHILS # BLD: 0.01 E9/L (ref 0–0.2)
BASOPHILS NFR BLD: 0.2 % (ref 0–2)
BILIRUB SERPL-MCNC: 0.5 MG/DL (ref 0–1.2)
BUN SERPL-MCNC: 15 MG/DL (ref 6–23)
CALCIUM SERPL-MCNC: 10.1 MG/DL (ref 8.6–10.2)
CHLORIDE SERPL-SCNC: 108 MMOL/L (ref 98–107)
CHOLESTEROL, TOTAL: 237 MG/DL (ref 0–199)
CO2 SERPL-SCNC: 22 MMOL/L (ref 22–29)
CREAT SERPL-MCNC: 0.7 MG/DL (ref 0.5–1)
EOSINOPHIL # BLD: 0.04 E9/L (ref 0.05–0.5)
EOSINOPHIL NFR BLD: 0.7 % (ref 0–6)
ERYTHROCYTE [DISTWIDTH] IN BLOOD BY AUTOMATED COUNT: 12.5 FL (ref 11.5–15)
GLUCOSE SERPL-MCNC: 113 MG/DL (ref 74–99)
HBA1C MFR BLD: 5.6 % (ref 4–5.6)
HCT VFR BLD AUTO: 47.8 % (ref 34–48)
HDLC SERPL-MCNC: 62 MG/DL
HGB BLD-MCNC: 15.1 G/DL (ref 11.5–15.5)
IMM GRANULOCYTES # BLD: 0.01 E9/L
IMM GRANULOCYTES NFR BLD: 0.2 % (ref 0–5)
LDLC SERPL CALC-MCNC: 151 MG/DL (ref 0–99)
LYMPHOCYTES # BLD: 1.61 E9/L (ref 1.5–4)
LYMPHOCYTES NFR BLD: 29.4 % (ref 20–42)
MCH RBC QN AUTO: 29.2 PG (ref 26–35)
MCHC RBC AUTO-ENTMCNC: 31.6 % (ref 32–34.5)
MCV RBC AUTO: 92.3 FL (ref 80–99.9)
MONOCYTES # BLD: 0.34 E9/L (ref 0.1–0.95)
MONOCYTES NFR BLD: 6.2 % (ref 2–12)
NEUTROPHILS # BLD: 3.46 E9/L (ref 1.8–7.3)
NEUTS SEG NFR BLD: 63.3 % (ref 43–80)
PLATELET # BLD AUTO: 226 E9/L (ref 130–450)
PMV BLD AUTO: 10.2 FL (ref 7–12)
POTASSIUM SERPL-SCNC: 4.1 MMOL/L (ref 3.5–5)
PROT SERPL-MCNC: 7.3 G/DL (ref 6.4–8.3)
RBC # BLD AUTO: 5.18 E12/L (ref 3.5–5.5)
SODIUM SERPL-SCNC: 146 MMOL/L (ref 132–146)
TRIGL SERPL-MCNC: 120 MG/DL (ref 0–149)
TSH SERPL-MCNC: 2.89 UIU/ML (ref 0.27–4.2)
VIT B12 SERPL-MCNC: 1251 PG/ML (ref 211–946)
VLDLC SERPL CALC-MCNC: 24 MG/DL
WBC # BLD: 5.5 E9/L (ref 4.5–11.5)

## 2023-08-01 RX ORDER — LISINOPRIL 5 MG/1
TABLET ORAL
Qty: 90 TABLET | Refills: 3 | Status: SHIPPED | OUTPATIENT
Start: 2023-08-01

## 2023-08-01 NOTE — TELEPHONE ENCOUNTER
Last Appointment:  2/2/2023  Future Appointments   Date Time Provider [address]   9/5/2023  9:00 AM Amon Sandifer,  [facility]

## 2023-09-02 SDOH — ECONOMIC STABILITY: INCOME INSECURITY: HOW HARD IS IT FOR YOU TO PAY FOR THE VERY BASICS LIKE FOOD, HOUSING, MEDICAL CARE, AND HEATING?: NOT HARD AT ALL

## 2023-09-02 SDOH — ECONOMIC STABILITY: TRANSPORTATION INSECURITY
IN THE PAST 12 MONTHS, HAS LACK OF TRANSPORTATION KEPT YOU FROM MEETINGS, WORK, OR FROM GETTING THINGS NEEDED FOR DAILY LIVING?: NO

## 2023-09-02 SDOH — ECONOMIC STABILITY: FOOD INSECURITY: WITHIN THE PAST 12 MONTHS, YOU WORRIED THAT YOUR FOOD WOULD RUN OUT BEFORE YOU GOT MONEY TO BUY MORE.: NEVER TRUE

## 2023-09-02 SDOH — ECONOMIC STABILITY: FOOD INSECURITY: WITHIN THE PAST 12 MONTHS, THE FOOD YOU BOUGHT JUST DIDN'T LAST AND YOU DIDN'T HAVE MONEY TO GET MORE.: NEVER TRUE

## 2023-09-02 SDOH — ECONOMIC STABILITY: HOUSING INSECURITY
IN THE LAST 12 MONTHS, WAS THERE A TIME WHEN YOU DID NOT HAVE A STEADY PLACE TO SLEEP OR SLEPT IN A SHELTER (INCLUDING NOW)?: NO

## 2023-09-05 ENCOUNTER — OFFICE VISIT (OUTPATIENT)
Dept: FAMILY MEDICINE CLINIC | Age: 63
End: 2023-09-05
Payer: MEDICARE

## 2023-09-05 VITALS
HEART RATE: 68 BPM | TEMPERATURE: 97.1 F | RESPIRATION RATE: 15 BRPM | HEIGHT: 62 IN | DIASTOLIC BLOOD PRESSURE: 80 MMHG | OXYGEN SATURATION: 95 % | SYSTOLIC BLOOD PRESSURE: 126 MMHG | BODY MASS INDEX: 29.26 KG/M2 | WEIGHT: 159 LBS

## 2023-09-05 DIAGNOSIS — G89.29 CHRONIC BILATERAL LOW BACK PAIN WITHOUT SCIATICA: ICD-10-CM

## 2023-09-05 DIAGNOSIS — M25.531 RIGHT WRIST PAIN: ICD-10-CM

## 2023-09-05 DIAGNOSIS — G56.01 CARPAL TUNNEL SYNDROME OF RIGHT WRIST: Primary | ICD-10-CM

## 2023-09-05 DIAGNOSIS — E78.5 HYPERLIPIDEMIA, UNSPECIFIED HYPERLIPIDEMIA TYPE: ICD-10-CM

## 2023-09-05 DIAGNOSIS — M54.50 CHRONIC BILATERAL LOW BACK PAIN WITHOUT SCIATICA: ICD-10-CM

## 2023-09-05 PROCEDURE — G8417 CALC BMI ABV UP PARAM F/U: HCPCS | Performed by: FAMILY MEDICINE

## 2023-09-05 PROCEDURE — 3079F DIAST BP 80-89 MM HG: CPT | Performed by: FAMILY MEDICINE

## 2023-09-05 PROCEDURE — 99214 OFFICE O/P EST MOD 30 MIN: CPT | Performed by: FAMILY MEDICINE

## 2023-09-05 PROCEDURE — 3017F COLORECTAL CA SCREEN DOC REV: CPT | Performed by: FAMILY MEDICINE

## 2023-09-05 PROCEDURE — 3074F SYST BP LT 130 MM HG: CPT | Performed by: FAMILY MEDICINE

## 2023-09-05 PROCEDURE — G8427 DOCREV CUR MEDS BY ELIG CLIN: HCPCS | Performed by: FAMILY MEDICINE

## 2023-09-05 PROCEDURE — 1036F TOBACCO NON-USER: CPT | Performed by: FAMILY MEDICINE

## 2023-09-06 DIAGNOSIS — E78.5 HYPERLIPIDEMIA, UNSPECIFIED HYPERLIPIDEMIA TYPE: ICD-10-CM

## 2023-09-06 LAB
CHOLESTEROL: 225 MG/DL
HDLC SERPL-MCNC: 61 MG/DL
LDL CHOLESTEROL: 142 MG/DL
TRIGL SERPL-MCNC: 109 MG/DL
VLDLC SERPL CALC-MCNC: 22 MG/DL

## 2023-11-07 ENCOUNTER — OFFICE VISIT (OUTPATIENT)
Dept: FAMILY MEDICINE CLINIC | Age: 63
End: 2023-11-07
Payer: MEDICARE

## 2023-11-07 VITALS
HEIGHT: 62 IN | TEMPERATURE: 98 F | HEART RATE: 77 BPM | RESPIRATION RATE: 16 BRPM | SYSTOLIC BLOOD PRESSURE: 124 MMHG | BODY MASS INDEX: 29.08 KG/M2 | DIASTOLIC BLOOD PRESSURE: 70 MMHG | WEIGHT: 158 LBS | OXYGEN SATURATION: 98 %

## 2023-11-07 DIAGNOSIS — R10.2 PELVIC PAIN: Primary | ICD-10-CM

## 2023-11-07 LAB
BILIRUBIN, POC: NORMAL
BLOOD URINE, POC: NORMAL
CLARITY, POC: NORMAL
COLOR, POC: YELLOW
GLUCOSE URINE, POC: NORMAL
KETONES, POC: NORMAL
LEUKOCYTE EST, POC: NORMAL
NITRITE, POC: NORMAL
PH, POC: 6.5
PROTEIN, POC: NORMAL
SPECIFIC GRAVITY, POC: 1.02
UROBILINOGEN, POC: 0.2

## 2023-11-07 PROCEDURE — 81002 URINALYSIS NONAUTO W/O SCOPE: CPT | Performed by: FAMILY MEDICINE

## 2023-11-07 PROCEDURE — G8427 DOCREV CUR MEDS BY ELIG CLIN: HCPCS | Performed by: FAMILY MEDICINE

## 2023-11-07 PROCEDURE — 3017F COLORECTAL CA SCREEN DOC REV: CPT | Performed by: FAMILY MEDICINE

## 2023-11-07 PROCEDURE — G8417 CALC BMI ABV UP PARAM F/U: HCPCS | Performed by: FAMILY MEDICINE

## 2023-11-07 PROCEDURE — 1123F ACP DISCUSS/DSCN MKR DOCD: CPT | Performed by: FAMILY MEDICINE

## 2023-11-07 PROCEDURE — 3074F SYST BP LT 130 MM HG: CPT | Performed by: FAMILY MEDICINE

## 2023-11-07 PROCEDURE — 1036F TOBACCO NON-USER: CPT | Performed by: FAMILY MEDICINE

## 2023-11-07 PROCEDURE — 99213 OFFICE O/P EST LOW 20 MIN: CPT | Performed by: FAMILY MEDICINE

## 2023-11-07 PROCEDURE — G8400 PT W/DXA NO RESULTS DOC: HCPCS | Performed by: FAMILY MEDICINE

## 2023-11-07 PROCEDURE — 1090F PRES/ABSN URINE INCON ASSESS: CPT | Performed by: FAMILY MEDICINE

## 2023-11-07 PROCEDURE — 3078F DIAST BP <80 MM HG: CPT | Performed by: FAMILY MEDICINE

## 2023-11-07 PROCEDURE — G8484 FLU IMMUNIZE NO ADMIN: HCPCS | Performed by: FAMILY MEDICINE

## 2023-11-17 DIAGNOSIS — R10.2 PELVIC PAIN: Primary | ICD-10-CM

## 2023-12-23 ENCOUNTER — PATIENT MESSAGE (OUTPATIENT)
Dept: FAMILY MEDICINE CLINIC | Age: 63
End: 2023-12-23

## 2023-12-26 RX ORDER — LISINOPRIL 5 MG/1
5 TABLET ORAL DAILY
Qty: 90 TABLET | Refills: 3 | Status: SHIPPED | OUTPATIENT
Start: 2023-12-26

## 2024-01-04 ENCOUNTER — TELEPHONE (OUTPATIENT)
Dept: FAMILY MEDICINE CLINIC | Age: 64
End: 2024-01-04

## 2024-01-04 NOTE — TELEPHONE ENCOUNTER
Pt called in and said she had her appointment with [facility] today. They wanted her to call and see if you would be willing to order a Post Voiding bladder u/s so she can have it done locally. Please Advise

## 2024-01-04 NOTE — TELEPHONE ENCOUNTER
Informed pt. She said the doctor she saw said that she felt that she needed to be seen by Sports Medicine instead so she put in a referral for her to see Sports Medicine at the [facility]. Nehal is going to wait to see them first and discuss treatment with them.

## 2024-01-05 ENCOUNTER — TELEPHONE (OUTPATIENT)
Dept: FAMILY MEDICINE CLINIC | Age: 64
End: 2024-01-05

## 2024-01-05 DIAGNOSIS — R33.9 URINARY RETENTION: ICD-10-CM

## 2024-01-05 DIAGNOSIS — R20.0 NUMBNESS OF PERINEUM: Primary | ICD-10-CM

## 2024-01-05 NOTE — TELEPHONE ENCOUNTER
Patient informed.  Advised patient to call office after 1 week if [facility] does not contact her to schedule appointment.

## 2024-01-05 NOTE — TELEPHONE ENCOUNTER
Patient was seen by [facility].  Patient stated during appointment they said she needed a referral to [facility] Neurology.  Can you please place referral?

## 2024-02-05 ENCOUNTER — TELEPHONE (OUTPATIENT)
Dept: FAMILY MEDICINE CLINIC | Age: 64
End: 2024-02-05

## 2024-02-05 RX ORDER — LISINOPRIL 5 MG/1
5 TABLET ORAL DAILY
Qty: 90 TABLET | Refills: 3 | Status: SHIPPED | OUTPATIENT
Start: 2024-02-05

## 2024-02-05 NOTE — TELEPHONE ENCOUNTER
Nehal calling about a pharmacy change in the new year and can't use [facility].     Her new mail order is now [facility]. I have this ready to send there for another year.       Last Appointment:  11/7/2023  Future Appointments   Date Time Provider Department Center   4/4/2024  9:15 AM Bryanna Day MD [facility]

## 2024-02-14 ENCOUNTER — TELEPHONE (OUTPATIENT)
Dept: FAMILY MEDICINE CLINIC | Age: 64
End: 2024-02-14

## 2024-02-14 DIAGNOSIS — R33.9 URINARY RETENTION: ICD-10-CM

## 2024-02-14 DIAGNOSIS — R10.2 PELVIC PAIN: ICD-10-CM

## 2024-02-14 DIAGNOSIS — R20.0 NUMBNESS OF PERINEUM: Primary | ICD-10-CM

## 2024-02-14 NOTE — TELEPHONE ENCOUNTER
Pt called in and said she has an order from [facility] to see PT for Pelvic Floor rehab. She was not sure where to go and was asking for your recommendation.

## 2024-03-07 ENCOUNTER — EVALUATION (OUTPATIENT)
Dept: PHYSICAL THERAPY | Age: 64
End: 2024-03-07

## 2024-03-07 DIAGNOSIS — R33.9 URINARY RETENTION: ICD-10-CM

## 2024-03-07 DIAGNOSIS — R10.2 PELVIC PAIN: ICD-10-CM

## 2024-03-07 DIAGNOSIS — R20.0 NUMBNESS OF PERINEUM: Primary | ICD-10-CM

## 2024-03-07 NOTE — PROGRESS NOTES
PHYSICAL THERAPY INITIAL EVALUATION    Phone: [phone]  Fax: [phone]     Date:  3/7/2024  Initial Evaluation Date: 3/7/2024    Patient Name:  Angeline Bauer    :  1960      Restrictions/Precautions:  none  Diagnosis:   Diagnosis Orders   1. Numbness of perineum        2. Urinary retention        3. Pelvic pain               Insurance/Certification information:  Medicare Part A and B  Referring Physician:  Bryanna Day MD  Date of Surgery/Injury: N/A    Visit# / total visits:     Evaluating Therapist: Francia Perera PT, DPT     [id number]    Past Medical History:   Past Medical History:   Diagnosis Date    Anxiety 2019    Depression     Essential hypertension 2019     Past Surgical History:   Past Surgical History:   Procedure Laterality Date    APPENDECTOMY       SECTION      x2    CHOLECYSTECTOMY      HYSTERECTOMY (CERVIX STATUS UNKNOWN)      KIDNEY REMOVAL Right        Patient identified by name and birth date: Yes     SUBJECTIVE       Reason for Referral:   Patient is a 63 year old F with chief complaint of low back pain (x-rays negative). She has seen PCP, GI, virtual appointment with gynecologist, neurologist, neurologist-gynecologist (pending in 2024), sports medicine. Symptoms started in May 2023. Pt has had increased periods of prolonged sitting with travel and caregiving for her sister and brother in law. She reports alternating constipation vs diarrhea with intermittent numbness of lumbar and sacral regions.     Symptom Duration: 10 months  Previous Treatment/Testing/Surgeries: s/p hysterectomy (25 years ago, still has ovaries intact-- abdominal approach), fallopian tubes tied then latera reattached, internal ultrasounds, gallbladder removal  Relevant Medical/LBP: R hip pain and sensitivity, previous low back pain associated with current issues.   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

## 2024-03-14 ENCOUNTER — TREATMENT (OUTPATIENT)
Dept: PHYSICAL THERAPY | Age: 64
End: 2024-03-14

## 2024-03-14 DIAGNOSIS — R33.9 URINARY RETENTION: ICD-10-CM

## 2024-03-14 DIAGNOSIS — R20.0 NUMBNESS OF PERINEUM: Primary | ICD-10-CM

## 2024-03-14 DIAGNOSIS — R10.2 PELVIC PAIN: ICD-10-CM

## 2024-03-14 NOTE — PROGRESS NOTES
benefits from skilled PT intervention.  []  Alter Plan of care:   []  Discharge:      Francia Perera, PT, DPT  [id number]    [facility]  Phone: [phone]  Fax: [phone]              ??  ?

## 2024-04-03 SDOH — HEALTH STABILITY: PHYSICAL HEALTH: ON AVERAGE, HOW MANY MINUTES DO YOU ENGAGE IN EXERCISE AT THIS LEVEL?: 30 MIN

## 2024-04-03 SDOH — HEALTH STABILITY: PHYSICAL HEALTH: ON AVERAGE, HOW MANY DAYS PER WEEK DO YOU ENGAGE IN MODERATE TO STRENUOUS EXERCISE (LIKE A BRISK WALK)?: 4 DAYS

## 2024-04-03 ASSESSMENT — PATIENT HEALTH QUESTIONNAIRE - PHQ9
1. LITTLE INTEREST OR PLEASURE IN DOING THINGS: NOT AT ALL
2. FEELING DOWN, DEPRESSED OR HOPELESS: NOT AT ALL
SUM OF ALL RESPONSES TO PHQ9 QUESTIONS 1 & 2: 0
SUM OF ALL RESPONSES TO PHQ QUESTIONS 1-9: 0
1. LITTLE INTEREST OR PLEASURE IN DOING THINGS: NOT AT ALL
SUM OF ALL RESPONSES TO PHQ9 QUESTIONS 1 & 2: 0
SUM OF ALL RESPONSES TO PHQ QUESTIONS 1-9: 0
2. FEELING DOWN, DEPRESSED OR HOPELESS: NOT AT ALL
SUM OF ALL RESPONSES TO PHQ QUESTIONS 1-9: 0
SUM OF ALL RESPONSES TO PHQ QUESTIONS 1-9: 0

## 2024-04-03 ASSESSMENT — LIFESTYLE VARIABLES
HOW OFTEN DO YOU HAVE SIX OR MORE DRINKS ON ONE OCCASION: 1
HOW OFTEN DO YOU HAVE A DRINK CONTAINING ALCOHOL: 2-4 TIMES A MONTH
HOW MANY STANDARD DRINKS CONTAINING ALCOHOL DO YOU HAVE ON A TYPICAL DAY: 1
HOW OFTEN DO YOU HAVE A DRINK CONTAINING ALCOHOL: 3
HOW MANY STANDARD DRINKS CONTAINING ALCOHOL DO YOU HAVE ON A TYPICAL DAY: 1 OR 2

## 2024-04-04 ENCOUNTER — OFFICE VISIT (OUTPATIENT)
Dept: FAMILY MEDICINE CLINIC | Age: 64
End: 2024-04-04

## 2024-04-04 ENCOUNTER — TREATMENT (OUTPATIENT)
Dept: PHYSICAL THERAPY | Age: 64
End: 2024-04-04
Payer: MEDICARE

## 2024-04-04 VITALS
HEIGHT: 62 IN | OXYGEN SATURATION: 99 % | SYSTOLIC BLOOD PRESSURE: 124 MMHG | HEART RATE: 67 BPM | DIASTOLIC BLOOD PRESSURE: 80 MMHG | BODY MASS INDEX: 30 KG/M2 | WEIGHT: 163 LBS | TEMPERATURE: 97.7 F | RESPIRATION RATE: 15 BRPM

## 2024-04-04 DIAGNOSIS — E78.5 HYPERLIPIDEMIA, UNSPECIFIED HYPERLIPIDEMIA TYPE: ICD-10-CM

## 2024-04-04 DIAGNOSIS — E53.8 VITAMIN B 12 DEFICIENCY: ICD-10-CM

## 2024-04-04 DIAGNOSIS — Z00.00 MEDICARE ANNUAL WELLNESS VISIT, SUBSEQUENT: Primary | ICD-10-CM

## 2024-04-04 DIAGNOSIS — R10.2 PELVIC PAIN: ICD-10-CM

## 2024-04-04 DIAGNOSIS — R73.9 HYPERGLYCEMIA: ICD-10-CM

## 2024-04-04 DIAGNOSIS — R20.0 NUMBNESS OF PERINEUM: Primary | ICD-10-CM

## 2024-04-04 DIAGNOSIS — I10 ESSENTIAL HYPERTENSION, BENIGN: ICD-10-CM

## 2024-04-04 DIAGNOSIS — Z12.31 SCREENING MAMMOGRAM, ENCOUNTER FOR: ICD-10-CM

## 2024-04-04 DIAGNOSIS — R33.9 URINARY RETENTION: ICD-10-CM

## 2024-04-04 DIAGNOSIS — M25.531 RIGHT WRIST PAIN: ICD-10-CM

## 2024-04-04 DIAGNOSIS — G56.01 CARPAL TUNNEL SYNDROME OF RIGHT WRIST: ICD-10-CM

## 2024-04-04 LAB
ALBUMIN SERPL-MCNC: 4.7 G/DL (ref 3.5–5.2)
ALP BLD-CCNC: 63 U/L (ref 35–104)
ALT SERPL-CCNC: 17 U/L (ref 0–32)
ANION GAP SERPL CALCULATED.3IONS-SCNC: 21 MMOL/L (ref 7–16)
AST SERPL-CCNC: 21 U/L (ref 0–31)
BILIRUB SERPL-MCNC: 0.3 MG/DL (ref 0–1.2)
BUN BLDV-MCNC: 15 MG/DL (ref 6–23)
CALCIUM SERPL-MCNC: 10.2 MG/DL (ref 8.6–10.2)
CHLORIDE BLD-SCNC: 103 MMOL/L (ref 98–107)
CHOLESTEROL: 239 MG/DL
CO2: 20 MMOL/L (ref 22–29)
CREAT SERPL-MCNC: 0.7 MG/DL (ref 0.5–1)
GFR SERPL CREATININE-BSD FRML MDRD: >90 ML/MIN/1.73M2
GLUCOSE BLD-MCNC: 88 MG/DL (ref 74–99)
HBA1C MFR BLD: 5.6 % (ref 4–5.6)
HDLC SERPL-MCNC: 66 MG/DL
LDL CHOLESTEROL: 155 MG/DL
POTASSIUM SERPL-SCNC: 4 MMOL/L (ref 3.5–5)
SODIUM BLD-SCNC: 144 MMOL/L (ref 132–146)
TOTAL PROTEIN: 7.5 G/DL (ref 6.4–8.3)
TRIGL SERPL-MCNC: 91 MG/DL
TSH SERPL DL<=0.05 MIU/L-ACNC: 2.18 UIU/ML (ref 0.27–4.2)
VITAMIN B-12: 1028 PG/ML (ref 211–946)
VLDLC SERPL CALC-MCNC: 18 MG/DL

## 2024-04-04 PROCEDURE — 97112 NEUROMUSCULAR REEDUCATION: CPT | Performed by: PHYSICAL THERAPIST

## 2024-04-04 PROCEDURE — 97140 MANUAL THERAPY 1/> REGIONS: CPT | Performed by: PHYSICAL THERAPIST

## 2024-04-04 NOTE — PATIENT INSTRUCTIONS
staying at a weight that's healthy for you, and not smoking or using tobacco. It also includes taking medicines as directed, managing other health conditions, and trying to get a healthy amount of sleep.  Follow-up care is a key part of your treatment and safety. Be sure to make and go to all appointments, and call your doctor if you are having problems. It's also a good idea to know your test results and keep a list of the medicines you take.  How can you care for yourself at home?  Diet    Use less salt when you cook and eat. This helps lower your blood pressure. Taste food before salting. Add only a little salt when you think you need it. With time, your taste buds will adjust to less salt.     Eat fewer snack items, fast foods, canned soups, and other high-salt, high-fat, processed foods.     Read food labels and try to avoid saturated and trans fats. They increase your risk of heart disease by raising cholesterol levels.     Limit the amount of solid fat--butter, margarine, and shortening--you eat. Use olive, peanut, or canola oil when you cook. Bake, broil, and steam foods instead of frying them.     Eat a variety of fruit and vegetables every day. Dark green, deep orange, red, or yellow fruits and vegetables are especially good for you. Examples include spinach, carrots, peaches, and berries.     Foods high in fiber can reduce your cholesterol and provide important vitamins and minerals. High-fiber foods include whole-grain cereals and breads, oatmeal, beans, brown rice, citrus fruits, and apples.     Eat lean proteins. Heart-healthy proteins include seafood, lean meats and poultry, eggs, beans, peas, nuts, seeds, and soy products.     Limit drinks and foods with added sugar. These include candy, desserts, and soda pop.   Heart-healthy lifestyle    If your doctor recommends it, get more exercise. For many people, walking is a good choice. Or you may want to swim, bike, or do other activities. Bit by bit,

## 2024-04-04 NOTE — PROGRESS NOTES
Medicare Annual Wellness Visit    Angeline Bauer is here for Medicare AWV    Assessment & Plan   Medicare annual wellness visit, subsequent  Carpal tunnel syndrome of right wrist  -     Clayton Olivo MD, [facility] ([facility])  Right wrist pain  -     Clayton Olivo MD, [facility] ([facility])  Vitamin B 12 deficiency  -     Vitamin B12; Future  Hyperglycemia  -     Hemoglobin A1C; Future  Essential hypertension, benign  -     Comprehensive Metabolic Panel; Future  -     Lipid Panel; Future  Hyperlipidemia, unspecified hyperlipidemia type  -     Comprehensive Metabolic Panel; Future  -     Lipid Panel; Future  -     TSH; Future  Screening mammogram, encounter for  -     [facility] AYANA DIGITAL SCREEN BILATERAL PER PROTOCOL; Future        Recommendations for Preventive Services Due: see orders and patient instructions/AVS.  Recommended screening schedule for the next 5-10 years is provided to the patient in written form: see Patient Instructions/AVS.     Return for Medicare Annual Wellness Visit in 1 year.     Subjective   The following acute and/or chronic problems were also addressed today:    Pelvic pain  Pelvic floor physical therapy in [place]  -very helpful      R carpal tunnel  EMG confirmed  Seeks carpal tunnel evaluation with specialist        Patient's complete Health Risk Assessment and screening values have been reviewed and are found in Flowsheets. The following problems were reviewed today and where indicated follow up appointments were made and/or referrals ordered.    Positive Risk Factor Screenings with Interventions:                  Hearing Screen:  Do you or your family notice any trouble with your hearing that hasn't been managed with hearing aids?: (!) Yes    Interventions:  See AVS for additional education material     Safety:  Do you have non-slip mats or non-slip surfaces or shower bars or grab bars in your shower or bathtub?: (!) No  Interventions:  See AVS for additional

## 2024-04-04 NOTE — PROGRESS NOTES
PHYSICAL THERAPY PROGRESS NOTE    Date:  2024  Initial Evaluation Date: 3/7/2024    Patient Name:  Angeline Bauer   :  1960    Restrictions/Precautions:  none    Diagnosis:     Diagnosis Orders   1. Numbness of perineum        2. Urinary retention        3. Pelvic pain                Insurance/Certification information:  Medicare Part A and B  Referring Physician:  Bryanna Day MD  Plan of care signed: yes  Date of Surgery/Injury: N/A     Visit# / total visits: 3 / 12    Treating Therapist: Francia Perera, PT, DPT  [id number]  Past Medical History:   Past Medical History:   Diagnosis Date    Anxiety 2019    Depression     Essential hypertension 2019     Past Surgical History:   Past Surgical History:   Procedure Laterality Date    APPENDECTOMY       SECTION      x2    CHOLECYSTECTOMY      HYSTERECTOMY (CERVIX STATUS UNKNOWN)      KIDNEY REMOVAL Right        Patient identified by name and birth date: Yes         SUBJECTIVE      Pt just returned from vacation and reports limited HEP during this time. Pt reports feeling \"awkward\" while completing TE's. Pt notes primarily experiencing discomfort while on the plane or when sitting in the car for longer periods. She was able to hold urine while on the plane. Pt reports 6/10 discomfort following driving and sitting in waiting area.     OBJECTIVE     External Connective Tissue/Muscle Assessment: ?  Breathing: primarily chest excursion initially, was able to improve with verbal/tacitle input-- most improvement noted with placement of pt hands on her abdomen in supine butterfly    Informed consent for internal evaluation consent given : yes, signed copy placed in chart    External PF Observation:  Contraction: WNL, compensation with glutes and adductors observed  Relax/Rest: WNL  Perineal Bulge: paradoxical contraction noted  Introitus:WNL    Inspiration: WNL  Cough: WNL  Skin Condition: no irritation noted or areas of redness

## 2024-04-09 ENCOUNTER — TELEPHONE (OUTPATIENT)
Dept: FAMILY MEDICINE CLINIC | Age: 64
End: 2024-04-09

## 2024-04-09 DIAGNOSIS — E78.5 HYPERLIPIDEMIA, UNSPECIFIED HYPERLIPIDEMIA TYPE: ICD-10-CM

## 2024-04-09 DIAGNOSIS — I10 ESSENTIAL HYPERTENSION, BENIGN: Primary | ICD-10-CM

## 2024-04-09 NOTE — TELEPHONE ENCOUNTER
----- Message from Bryanna Day MD sent at 4/8/2024  4:08 PM EDT -----  Labs show borderline cholesterol, everything else normal  Recommend Mediterranean diet and recheck in 6 months  We can also get a coronary calcium scan to check her arteries now if she wants    The 10-year ASCVD risk score (Maral HERBERT, et al., 2019) is: 7.1%    Values used to calculate the score:      Age: 63 years      Sex: Female      Is Non- : No      Diabetic: No      Tobacco smoker: No      Systolic Blood Pressure: 124 mmHg      Is BP treated: Yes      HDL Cholesterol: 66 mg/dL      Total Cholesterol: 239 mg/dL

## 2024-04-09 NOTE — TELEPHONE ENCOUNTER
Nehal informed of result. Sent a reference sheet on the Mediterranean Diet through [employer] and will mail one as well.     She would like to have CT calcium score through [facility]. Can you place an order? Patient aware that [facility] Prior Auth Dept will check to see if it is a covered service through her insurance, if it is not testing may be cancelled.

## 2024-04-11 ENCOUNTER — TREATMENT (OUTPATIENT)
Dept: PHYSICAL THERAPY | Age: 64
End: 2024-04-11

## 2024-04-11 ENCOUNTER — TELEPHONE (OUTPATIENT)
Dept: FAMILY MEDICINE CLINIC | Age: 64
End: 2024-04-11

## 2024-04-11 DIAGNOSIS — H93.19 TINNITUS AURIUM, UNSPECIFIED LATERALITY: Primary | ICD-10-CM

## 2024-04-11 DIAGNOSIS — R20.0 NUMBNESS OF PERINEUM: Primary | ICD-10-CM

## 2024-04-11 DIAGNOSIS — R10.2 PELVIC PAIN: ICD-10-CM

## 2024-04-11 DIAGNOSIS — R33.9 URINARY RETENTION: ICD-10-CM

## 2024-04-11 NOTE — TELEPHONE ENCOUNTER
Nehal would like a referral to see Jose Luis PATINO an ENT to discuss tinnitus.     Referral is ready to be sent.

## 2024-04-11 NOTE — PROGRESS NOTES
recommendations [] Genital Hygiene     Method of Education: [x] Verbal  [x] Demo  [x] Written  Comprehension of Education:  [x] Verbalizes understanding.  [] Demonstrates understanding.  [x] Needs Review.  [] Demonstrates/verbalizes understanding of HEP/Ed previously given.     HEP: supine butterfly stretch, child's pose, happy baby stretch    Next visit: continue with stretching, coordination training    ASSESSMENT/COMMENTS     Pt is making Good progress toward stated plan of care.   -Rehab Potential: Good  -Requires PT Follow Up: Yes    Billing:   Time in: 0964  Time out: 1048  Total Time: 53    CPT codes:  [] PT Re-evaluation 88748  [x] Manual therapy 91163 45 minutes  [] Therapeutic exercises 77685 5 minutes  [] Neuromuscular reeducation 25026 5 minutes      -Response to Treatment:   Pt continues to demonstrate good progress towards long term goals since last session. She purchased pelvic wand and was educated in appropriate use and guided through self release techniques during session. She continues to have elevated resting tone but reported decreased sensitivity to internal exam and palpation. External release of pudendal nerve distribution completed following therapist assisted soft tissue mobilization internally but prior to pelvic wand training. Pt also educated in technique for goddess squat stretch to decrease restrictions throughout PF.    GOALS/PLAN     Continue per initial evaluation 3/7/24    Plan:   [x]  Continues Plan of care: Treatment covered based on POC and graduated to patient's progress. Pt education continues at each visit to obtain maximum benefits from skilled PT intervention.  []  Alter Plan of care:   []  Discharge:      Francia Perera, PT, DPT  [id number]    [facility] Physical Therapy  Phone: [phone]  Fax: [phone]              ??  ?

## 2024-04-25 ENCOUNTER — TREATMENT (OUTPATIENT)
Dept: PHYSICAL THERAPY | Age: 64
End: 2024-04-25

## 2024-04-25 DIAGNOSIS — R33.9 URINARY RETENTION: ICD-10-CM

## 2024-04-25 DIAGNOSIS — R10.2 PELVIC PAIN: ICD-10-CM

## 2024-04-25 DIAGNOSIS — R20.0 NUMBNESS OF PERINEUM: Primary | ICD-10-CM

## 2024-04-25 NOTE — PROGRESS NOTES
PHYSICAL THERAPY PROGRESS NOTE    Date:  2024  Initial Evaluation Date: 3/7/2024    Patient Name:  Angeline Bauer   :  1960    Restrictions/Precautions:  none    Diagnosis:     Diagnosis Orders   1. Numbness of perineum        2. Urinary retention        3. Pelvic pain                Insurance/Certification information:  Medicare Part A and B  Referring Physician:  Bryanna Day MD  Plan of care signed: yes  Date of Surgery/Injury: N/A     Visit# / total visits:     Treating Therapist: Francia Perera, PT, DPT  [id number]  Past Medical History:   Past Medical History:   Diagnosis Date    Anxiety 2019    Depression     Essential hypertension 2019     Past Surgical History:   Past Surgical History:   Procedure Laterality Date    APPENDECTOMY       SECTION      x2    CHOLECYSTECTOMY      HYSTERECTOMY (CERVIX STATUS UNKNOWN)      KIDNEY REMOVAL Right        Patient identified by name and birth date: Yes         SUBJECTIVE      Pt notes increased burning after sitting for many hours at volleyball tournaments. Pt feels sore and somewhat burning today. 4/10 \"irritation\" more than painful.    OBJECTIVE     External Connective Tissue/Muscle Assessment: ?  Breathing: --    Informed consent for internal evaluation consent given : yes, signed copy placed in chart    External PF Observation:  Contraction: --  Relax/Rest: --  Perineal Bulge: --  Introitus:WNL    Inspiration: --  Cough: --  Skin Condition: no irritation noted or areas of redness observed      Internal Pelvic Floor Connective Tissue/Muscle Tone:   Left Right   Introitus Hypertonic/Short Hypertonic/Short   Bulbospongiosus Hypertonic/Short Hypertonic/Short   Ischiocavernosis Hypertonic/Short Hypertonic/Short   Periurethrals Hypertonic/Short Hypertonic/Short   Perineal body Hypertonic/Short Hypertonic/Short   Pubococcygeus Hypertonic/Short Hypertonic/Short   Iliococcygeus Hypertonic/Short Hypertonic/Short   Coccygeus

## 2024-05-02 ENCOUNTER — TREATMENT (OUTPATIENT)
Dept: PHYSICAL THERAPY | Age: 64
End: 2024-05-02
Payer: MEDICARE

## 2024-05-02 DIAGNOSIS — R20.0 NUMBNESS OF PERINEUM: Primary | ICD-10-CM

## 2024-05-02 DIAGNOSIS — R33.9 URINARY RETENTION: ICD-10-CM

## 2024-05-02 DIAGNOSIS — R10.2 PELVIC PAIN: ICD-10-CM

## 2024-05-02 PROCEDURE — 97140 MANUAL THERAPY 1/> REGIONS: CPT | Performed by: PHYSICAL THERAPIST

## 2024-05-02 PROCEDURE — 97112 NEUROMUSCULAR REEDUCATION: CPT | Performed by: PHYSICAL THERAPIST

## 2024-05-02 NOTE — PROGRESS NOTES
PHYSICAL THERAPY PROGRESS NOTE    Date:  2024  Initial Evaluation Date: 3/7/2024    Patient Name:  Angeline Bauer   :  1960    Restrictions/Precautions:  none    Diagnosis:     Diagnosis Orders   1. Numbness of perineum        2. Urinary retention        3. Pelvic pain                  Insurance/Certification information:  Medicare Part A and B  Referring Physician:  Bryanna Day MD  Plan of care signed: yes  Date of Surgery/Injury: N/A     Visit# / total visits:     Treating Therapist: Francia Perera PT, DPT  [id number]  Past Medical History:   Past Medical History:   Diagnosis Date    Anxiety 2019    Depression     Essential hypertension 2019     Past Surgical History:   Past Surgical History:   Procedure Laterality Date    APPENDECTOMY       SECTION      x2    CHOLECYSTECTOMY      HYSTERECTOMY (CERVIX STATUS UNKNOWN)      KIDNEY REMOVAL Right        Patient identified by name and birth date: Yes         SUBJECTIVE      Bladder:   Daytime voiding frequency: every 30 min approx -- hourly  Nighttime voiding frequency: 2x/night -- 1x/night, occasionally 2x  Urinary urgency/leaking with urge: yes- started since May, no leakage -- pt reports good compliance with urge suppression, decreased intensity and frequency.   Triggers for leaking: none  Leaking with cough/sneeze/laugh/exercise: incidentally -- none since starting therapy  Amount of incontinence: N/A  Bladder emptying without warning: no  Pain/burning with urination: not typically, will occasionally have with dehydration -- not experiencing, mild discomfort if waiting too long to void.   Pain with full bladder: no  Difficulty starting stream/hesitancy: no  Straining: yes sometimes at the end, 25%,  sometimes -- same as eval  Sensation of incomplete bladder emptying: no  Hovering while urinating: no   JIC urination: yes-- nervous pee  Post void dribble: yes, 25% -- same as eval  Falling out sensation or bulging: no  Pad

## 2024-05-05 ENCOUNTER — HOSPITAL ENCOUNTER (OUTPATIENT)
Dept: CT IMAGING | Age: 64
Discharge: HOME | End: 2024-05-07
Payer: MEDICARE

## 2024-05-05 DIAGNOSIS — I10 ESSENTIAL HYPERTENSION, BENIGN: ICD-10-CM

## 2024-05-05 DIAGNOSIS — E78.5 HYPERLIPIDEMIA, UNSPECIFIED HYPERLIPIDEMIA TYPE: ICD-10-CM

## 2024-05-05 PROCEDURE — 75571 CT HRT W/O DYE W/CA TEST: CPT

## 2024-05-05 PROCEDURE — 75571 CT HRT W/O DYE W/CA TEST: CPT | Performed by: INTERNAL MEDICINE

## 2024-05-16 ENCOUNTER — TREATMENT (OUTPATIENT)
Dept: PHYSICAL THERAPY | Age: 64
End: 2024-05-16

## 2024-05-16 DIAGNOSIS — R20.0 NUMBNESS OF PERINEUM: Primary | ICD-10-CM

## 2024-05-16 DIAGNOSIS — R10.2 PELVIC PAIN: ICD-10-CM

## 2024-05-16 DIAGNOSIS — R33.9 URINARY RETENTION: ICD-10-CM

## 2024-05-16 NOTE — PROGRESS NOTES
techniques  [] Lubricant use and recommendations [] Genital Hygiene     Method of Education: [x] Verbal  [x] Demo  [x] Written  Comprehension of Education:  [x] Verbalizes understanding.  [] Demonstrates understanding.  [x] Needs Review.  [] Demonstrates/verbalizes understanding of HEP/Ed previously given.     HEP: supine butterfly stretch, child's pose, happy baby stretch    Next visit: continue with stretching, coordination training    ASSESSMENT/COMMENTS     Pt is making Good progress toward stated plan of care.   -Rehab Potential: Good  -Requires PT Follow Up: Yes    Billing:   Time in: 1400  Time out: 1445  Total Time: 45    CPT codes:  [] PT Re-evaluation 32124  [x] Manual therapy 56116 45 minutes  [] Therapeutic exercises 48679 -- minutes  [] Neuromuscular reeducation 98988 -- minutes      -Response to Treatment:   Pt demonstrated no complications with IDN and MFR interventions this date and noted some relief of restrictions. Pt verbalized understanding of modifications to take if flare up begins to occur later in POC or upon d/c such as positioning and increasing movement.    GOALS/PLAN     Goals for Episode of Care: created on 3/7/24  To be achieved in 12 visits    Incontinence/Prolapse:?  Pt will verbalize an understanding of the anatomy and physiology of the problem as well as possible causes of chief complaint related to the pt's presentation.?met  Pt will demonstrate appropriate isolation of PFM including appropriate contraction and relaxation. met  Pt will be independent in and compliant with performance of a home exercise program provided by therapist and perform as instructed.   met  Pt will demonstrate consistent compliance with urgency suppression strategies. met  Pt will demonstrate use of functional pelvic floor muscle contraction by performing a pre-contraction (KNACK) to reduce/eliminate stress incontinence during cough/sneeze/laugh/lifting/jumping, etc.?partially met  Pt will report decreased

## 2024-05-23 ENCOUNTER — TREATMENT (OUTPATIENT)
Dept: PHYSICAL THERAPY | Age: 64
End: 2024-05-23
Payer: MEDICARE

## 2024-05-23 DIAGNOSIS — R33.9 URINARY RETENTION: ICD-10-CM

## 2024-05-23 DIAGNOSIS — R20.0 NUMBNESS OF PERINEUM: Primary | ICD-10-CM

## 2024-05-23 DIAGNOSIS — R10.2 PELVIC PAIN: ICD-10-CM

## 2024-05-23 PROCEDURE — 97140 MANUAL THERAPY 1/> REGIONS: CPT | Performed by: PHYSICAL THERAPIST

## 2024-05-23 NOTE — PROGRESS NOTES
bowel/bladder function [] Food diary [x] Home exercise program  []Defecation dynamics/Squatty potty/elevation  [x] Postural/breathing techniques  [] Lubricant use and recommendations [] Genital Hygiene     Method of Education: [x] Verbal  [x] Demo  [x] Written  Comprehension of Education:  [x] Verbalizes understanding.  [] Demonstrates understanding.  [x] Needs Review.  [] Demonstrates/verbalizes understanding of HEP/Ed previously given.     HEP: supine butterfly stretch, child's pose, happy baby stretch    Next visit: continue with stretching, coordination training    ASSESSMENT/COMMENTS     Pt is making Good progress toward stated plan of care.   -Rehab Potential: Good  -Requires PT Follow Up: Yes    Billing:   Time in: 1500  Time out: 1545  Total Time: 45    CPT codes:  [] PT Re-evaluation 23311  [x] Manual therapy 69029 45 minutes  [] Therapeutic exercises 38129 -- minutes  [] Neuromuscular reeducation 74280 -- minutes      -Response to Treatment:   Pt tolerated IDN and soft tissue mobilization again this session noting decreased symptoms and sensitivity. Pt reported good carryover of symptom management from last session with posterior interventions along hamstring and pudendal nerve. Pt instructed in seated stretches to complete to decrease instances of tightness and burning type sensations.    GOALS/PLAN     Goals for Episode of Care: created on 3/7/24  To be achieved in 12 visits    Incontinence/Prolapse:?  Pt will verbalize an understanding of the anatomy and physiology of the problem as well as possible causes of chief complaint related to the pt's presentation.?met  Pt will demonstrate appropriate isolation of PFM including appropriate contraction and relaxation. met  Pt will be independent in and compliant with performance of a home exercise program provided by therapist and perform as instructed.   met  Pt will demonstrate consistent compliance with urgency suppression strategies. met  Pt will

## 2024-05-30 ENCOUNTER — TREATMENT (OUTPATIENT)
Dept: PHYSICAL THERAPY | Age: 64
End: 2024-05-30
Payer: MEDICARE

## 2024-05-30 DIAGNOSIS — R20.0 NUMBNESS OF PERINEUM: Primary | ICD-10-CM

## 2024-05-30 DIAGNOSIS — R10.2 PELVIC PAIN: ICD-10-CM

## 2024-05-30 DIAGNOSIS — R33.9 URINARY RETENTION: ICD-10-CM

## 2024-05-30 PROCEDURE — 97140 MANUAL THERAPY 1/> REGIONS: CPT | Performed by: PHYSICAL THERAPIST

## 2024-05-30 NOTE — PROGRESS NOTES
PHYSICAL THERAPY PROGRESS NOTE    Date:  2024  Initial Evaluation Date: 3/7/2024    Patient Name:  Angeline Bauer   :  1960    Restrictions/Precautions:  none    Diagnosis:     Diagnosis Orders   1. Numbness of perineum        2. Urinary retention        3. Pelvic pain                  Insurance/Certification information:  Medicare Part A and B  Referring Physician:  Bryanna Day MD  Plan of care signed: yes  Date of Surgery/Injury: N/A     Visit# / total visits:     Treating Therapist: Francia Perera PT, DPT  [id number]  Past Medical History:   Past Medical History:   Diagnosis Date    Anxiety 2019    Depression     Essential hypertension 2019     Past Surgical History:   Past Surgical History:   Procedure Laterality Date    APPENDECTOMY       SECTION      x2    CHOLECYSTECTOMY      HYSTERECTOMY (CERVIX STATUS UNKNOWN)      KIDNEY REMOVAL Right        Patient identified by name and birth date: Yes     SUBJECTIVE      Pt reports sensitivity comes and goes. She had a bad day yesterday and couldn't \"breathe it out\" but did relax later in the evening.     OBJECTIVE     External Connective Tissue/Muscle Assessment: ?  Breathing: --    Informed consent for internal evaluation consent given : yes, signed copy placed in chart    External PF Observation:  Contraction: --  Relax/Rest: --  Perineal Bulge: --  Introitus:WNL    Inspiration: --  Cough: --  Skin Condition: no irritation noted or areas of redness observed      Internal Pelvic Floor Connective Tissue/Muscle Tone:   Left Right   Introitus Hypertonic/Short WNL   Bulbospongiosus WNL WNL   Ischiocavernosis WNL WNL   Periurethrals WNL WNL   Perineal body Hypertonic/Short WNL   Pubococcygeus Hypertonic/Short WNL   Iliococcygeus WNL WNL   Coccygeus WNL WNL   Obturator Internus WNL WNL   External anal sphincter NT NT   Puborectalis NT NT       Tone assessment:  Pt demonstrated substantially improved resting tone from previous

## 2024-06-13 ENCOUNTER — TREATMENT (OUTPATIENT)
Dept: PHYSICAL THERAPY | Age: 64
End: 2024-06-13
Payer: MEDICARE

## 2024-06-13 DIAGNOSIS — R33.9 URINARY RETENTION: ICD-10-CM

## 2024-06-13 DIAGNOSIS — R10.2 PELVIC PAIN: ICD-10-CM

## 2024-06-13 DIAGNOSIS — R20.0 NUMBNESS OF PERINEUM: Primary | ICD-10-CM

## 2024-06-13 PROCEDURE — 97530 THERAPEUTIC ACTIVITIES: CPT | Performed by: PHYSICAL THERAPIST

## 2024-06-13 PROCEDURE — 97140 MANUAL THERAPY 1/> REGIONS: CPT | Performed by: PHYSICAL THERAPIST

## 2024-06-13 NOTE — PROGRESS NOTES
PHYSICAL THERAPY PROGRESS NOTE    Date:  2024  Initial Evaluation Date: 3/7/2024    Patient Name:  Angeline Bauer   :  1960    Restrictions/Precautions:  none    Diagnosis:     Diagnosis Orders   1. Numbness of perineum        2. Urinary retention        3. Pelvic pain                  Insurance/Certification information:  Medicare Part A and B  Referring Physician:  Bryanna Day MD  Plan of care signed: yes  Date of Surgery/Injury: N/A     Visit# / total visits: 10 / 12    Treating Therapist: Francia Perera PT, DPT  [id number]  Past Medical History:   Past Medical History:   Diagnosis Date    Anxiety 2019    Depression     Essential hypertension 2019     Past Surgical History:   Past Surgical History:   Procedure Laterality Date    APPENDECTOMY       SECTION      x2    CHOLECYSTECTOMY      HYSTERECTOMY (CERVIX STATUS UNKNOWN)      KIDNEY REMOVAL Right        Patient identified by name and birth date: Yes     SUBJECTIVE      Pt was able to attend sports games (2 hrs of soccer, 3 hours of basketball) without pain. She did have increase of pain at  home for family  which she feels is due to stress levels. Discussed recommendation for referral to counseling to learn stress management and anxiety reduction techniques as pt feels these are the primary triggers for symptoms.    OBJECTIVE     External Connective Tissue/Muscle Assessment: ?  Breathing: --    Informed consent for internal evaluation consent given : yes, signed copy placed in chart    External PF Observation:  Contraction: --  Relax/Rest: --  Perineal Bulge: --  Introitus:WNL    Inspiration: --  Cough: --  Skin Condition: no irritation noted or areas of redness observed      Internal Pelvic Floor Connective Tissue/Muscle Tone:   Left Right   Introitus WNL WNL   Bulbospongiosus WNL WNL   Ischiocavernosis WNL WNL   Periurethrals WNL WNL   Perineal body WNL WNL   Pubococcygeus WNL WNL   Iliococcygeus WNL WNL

## 2024-07-10 ENCOUNTER — PROCEDURE VISIT (OUTPATIENT)
Dept: AUDIOLOGY | Age: 64
End: 2024-07-10
Payer: MEDICARE

## 2024-07-10 ENCOUNTER — OFFICE VISIT (OUTPATIENT)
Dept: ENT CLINIC | Age: 64
End: 2024-07-10
Payer: MEDICARE

## 2024-07-10 VITALS
HEART RATE: 67 BPM | HEIGHT: 62 IN | SYSTOLIC BLOOD PRESSURE: 124 MMHG | OXYGEN SATURATION: 97 % | TEMPERATURE: 97.4 F | WEIGHT: 167 LBS | DIASTOLIC BLOOD PRESSURE: 74 MMHG | BODY MASS INDEX: 30.73 KG/M2

## 2024-07-10 DIAGNOSIS — H90.3 SENSORINEURAL HEARING LOSS (SNHL) OF BOTH EARS: ICD-10-CM

## 2024-07-10 DIAGNOSIS — H93.13 TINNITUS OF BOTH EARS: Primary | ICD-10-CM

## 2024-07-10 DIAGNOSIS — M26.622 TENDERNESS OF LEFT TEMPOROMANDIBULAR JOINT: ICD-10-CM

## 2024-07-10 PROCEDURE — 92557 COMPREHENSIVE HEARING TEST: CPT | Performed by: AUDIOLOGIST

## 2024-07-10 PROCEDURE — 3078F DIAST BP <80 MM HG: CPT

## 2024-07-10 PROCEDURE — 92567 TYMPANOMETRY: CPT | Performed by: AUDIOLOGIST

## 2024-07-10 PROCEDURE — G8417 CALC BMI ABV UP PARAM F/U: HCPCS

## 2024-07-10 PROCEDURE — 3074F SYST BP LT 130 MM HG: CPT

## 2024-07-10 PROCEDURE — G8400 PT W/DXA NO RESULTS DOC: HCPCS

## 2024-07-10 PROCEDURE — 3017F COLORECTAL CA SCREEN DOC REV: CPT

## 2024-07-10 PROCEDURE — 1123F ACP DISCUSS/DSCN MKR DOCD: CPT

## 2024-07-10 PROCEDURE — 99203 OFFICE O/P NEW LOW 30 MIN: CPT

## 2024-07-10 PROCEDURE — G8427 DOCREV CUR MEDS BY ELIG CLIN: HCPCS

## 2024-07-10 PROCEDURE — 1090F PRES/ABSN URINE INCON ASSESS: CPT

## 2024-07-10 PROCEDURE — 1036F TOBACCO NON-USER: CPT

## 2024-07-10 NOTE — PROGRESS NOTES
Subjective:     Patient ID:  Angeline Bauer is a 63 y.o. female.    HPI:    Hearing Loss  Patient presents today with complaints of hearing loss.  Concern regarding hearing has been present for several years. She has not failed a prior hearing test.The patient reports difficulty hearing, turning up the T.V., saying \"huh\" or \"what\".     Tinnitus  Patient presents with tinnitus.Onset of symptoms was gradual several years ago ago with unchanged course since that time. Patient describes the tinnitus as constant located in the bilateral ear. The quality is described as high pitch that sounds like hissing. The pattern is nonpulsatile with an intensity that is loud. Patient describes her level of annoyance as moderately annoying, always aware. Associated symptoms include hearing loss Family history is negative family history for tinnitusPatient has had no prior evaluation, treatment or surgery for tinnitus  Previous treatments include none.      Patient does not have hearing aids at this time.  History of Head trauma: no   Description: none  History of surgery to the head/neck: yes   Description:hemithyroid  History of cerumen impaction: no  History of noise exposure: yes   Type:several year exposure to equipment/ rental tools   Spinning: no  Hearing loss: yes    Fluctuating: yes  Aural pressure: yes  Tinnitus:yes  Otalgia:no    Dores report intermittent ear pressure with pain down the jaw.   Does report teeth grinding.   Past Medical History:   Diagnosis Date    Anxiety 2019    Depression     Essential hypertension 2019     Past Surgical History:   Procedure Laterality Date    APPENDECTOMY       SECTION      x2    CHOLECYSTECTOMY      HYSTERECTOMY (CERVIX STATUS UNKNOWN)      KIDNEY REMOVAL Right      Family History   Problem Relation Age of Onset    COPD Mother     Heart Disease Mother     Heart Attack Father      Social History     Socioeconomic History    Marital status:      Spouse name:

## 2024-07-11 ENCOUNTER — TREATMENT (OUTPATIENT)
Dept: PHYSICAL THERAPY | Age: 64
End: 2024-07-11
Payer: MEDICARE

## 2024-07-11 DIAGNOSIS — R10.2 PELVIC PAIN: ICD-10-CM

## 2024-07-11 DIAGNOSIS — R20.0 NUMBNESS OF PERINEUM: Primary | ICD-10-CM

## 2024-07-11 DIAGNOSIS — R33.9 URINARY RETENTION: ICD-10-CM

## 2024-07-11 PROCEDURE — 97530 THERAPEUTIC ACTIVITIES: CPT | Performed by: PHYSICAL THERAPIST

## 2024-07-11 PROCEDURE — 97140 MANUAL THERAPY 1/> REGIONS: CPT | Performed by: PHYSICAL THERAPIST

## 2024-07-11 NOTE — PROGRESS NOTES
This patient was referred for audiometric/tympanometric testing by MARGRET Bashir due to tinnitus and hearing loss.      Audiometry using pure tone air and bone conduction revealed borderline normal to mild hearing loss through 2000 Hz sloping to a moderate sensorineural hearing loss in the left ear and a moderate severe sensorineural hearing loss in the right ear. Asymmetric hearing at 6000 and 8000 Hz.   Reliability was good. Speech reception thresholds were in good agreement with the pure tone averages, bilaterally. Speech discrimination scores were excellent, bilaterally.    Tympanometry revealed slightly shallow (0.25 ml ) in the right ear and  normal middle ear peak pressure and compliance, in the left ear.        The results were reviewed with the patient and CNP.     Recommendations for follow up will be made pending ordering provider consult.    Antonio Baez/CCC-A  [place] Lic # [id number]

## 2024-07-11 NOTE — PROGRESS NOTES
PHYSICAL THERAPY PROGRESS NOTE / DISCHARGE SUMMARY    Date:  2024  Initial Evaluation Date: 3/7/2024    Patient Name:  Angeline Bauer   :  1960    Restrictions/Precautions:  none    Diagnosis:     Diagnosis Orders   1. Numbness of perineum        2. Urinary retention        3. Pelvic pain               Insurance/Certification information:  Medicare Part A and B  Referring Physician:  Bryanna Day MD  Plan of care signed: yes  Date of Surgery/Injury: N/A     Visit# / total visits:     Treating Therapist: Francia Perera PT, DPT  [id number]  Past Medical History:   Past Medical History:   Diagnosis Date    Anxiety 2019    Depression     Essential hypertension 2019     Past Surgical History:   Past Surgical History:   Procedure Laterality Date    APPENDECTOMY       SECTION      x2    CHOLECYSTECTOMY      HYSTERECTOMY (CERVIX STATUS UNKNOWN)      KIDNEY REMOVAL Right        Patient identified by name and birth date: Yes     SUBJECTIVE      Bladder:   Daytime voiding frequency: every 30 min approx -- hourly -- 90 min approx, decreased urgency  Nighttime voiding frequency: 2x/night -- 1x/night, occasionally 2x -- 1x at night, approx 4-5 am if needed  Urinary urgency/leaking with urge: yes- started since May, no leakage -- pt reports good compliance with urge suppression, decreased intensity and frequency. -- no leaking with urge, moderate urge but only having very strong urge if attempting to delay  Triggers for leaking: none  Leaking with cough/sneeze/laugh/exercise: incidentally -- none since starting therapy -- with sneezing, only incidentally  Amount of incontinence: N/A  Bladder emptying without warning: no  Pain/burning with urination: not typically, will occasionally have with dehydration -- not experiencing, mild discomfort if waiting too long to void. -- only if holding too long, not lasting long \"just noticing something\" type of sensation  Pain with full bladder:

## 2024-07-31 ENCOUNTER — TELEPHONE (OUTPATIENT)
Dept: FAMILY MEDICINE CLINIC | Age: 64
End: 2024-07-31

## 2024-07-31 NOTE — TELEPHONE ENCOUNTER
Nehal called looking for recommendations, knee pain present for 3 weeks and it is hard for her to get up from a standing position. She wanted to know if this is something you could treat or if she would need a referral.     Reviewed your schedule and you did not have an opening. Advised Nehal to use [facility], she was agreeable and will come tomorrow for eval.

## 2024-08-01 ENCOUNTER — OFFICE VISIT (OUTPATIENT)
Dept: ORTHOPEDIC SURGERY | Age: 64
End: 2024-08-01
Payer: MEDICARE

## 2024-08-01 VITALS — WEIGHT: 164 LBS | BODY MASS INDEX: 30.18 KG/M2 | HEIGHT: 62 IN

## 2024-08-01 DIAGNOSIS — M25.561 RIGHT KNEE PAIN, UNSPECIFIED CHRONICITY: ICD-10-CM

## 2024-08-01 DIAGNOSIS — M76.51 PATELLAR TENDONITIS OF RIGHT KNEE: Primary | ICD-10-CM

## 2024-08-01 PROCEDURE — 1036F TOBACCO NON-USER: CPT | Performed by: PHYSICIAN ASSISTANT

## 2024-08-01 PROCEDURE — G8417 CALC BMI ABV UP PARAM F/U: HCPCS | Performed by: PHYSICIAN ASSISTANT

## 2024-08-01 PROCEDURE — G8400 PT W/DXA NO RESULTS DOC: HCPCS | Performed by: PHYSICIAN ASSISTANT

## 2024-08-01 PROCEDURE — 99203 OFFICE O/P NEW LOW 30 MIN: CPT | Performed by: PHYSICIAN ASSISTANT

## 2024-08-01 PROCEDURE — 1123F ACP DISCUSS/DSCN MKR DOCD: CPT | Performed by: PHYSICIAN ASSISTANT

## 2024-08-01 PROCEDURE — 1090F PRES/ABSN URINE INCON ASSESS: CPT | Performed by: PHYSICIAN ASSISTANT

## 2024-08-01 PROCEDURE — G8427 DOCREV CUR MEDS BY ELIG CLIN: HCPCS | Performed by: PHYSICIAN ASSISTANT

## 2024-08-01 PROCEDURE — 3017F COLORECTAL CA SCREEN DOC REV: CPT | Performed by: PHYSICIAN ASSISTANT

## 2024-08-01 RX ORDER — METHYLPREDNISOLONE 4 MG/1
TABLET ORAL
Qty: 1 KIT | Refills: 0 | Status: SHIPPED | OUTPATIENT
Start: 2024-08-01

## 2024-08-01 NOTE — PATIENT INSTRUCTIONS
*At this time I have recommended an oral steroid, Medrol Dose Boyd as instructed. I did discuss potential side effect such as GI upset, mood changes, depression, anxiety, change in sleep habits.  The patient develops any of these signs or symptoms they will call the office immediately and we will discontinue the medication in appropriate fashion.  They are aware that he should not use any other oral anti-inflammatories while on the oral steroids.  They can use Tylenol OTC PRN.    *Patient can use VOLTAREN GEL 1% (DICLOFENAC SODIUM) Apply 4 grams twice daily to the affected knee as needed, which can be obtained over the counter.

## 2024-08-01 NOTE — PROGRESS NOTES
[facility]  New Patient Note      CHIEF COMPLAINT:   Chief Complaint   Patient presents with    Knee Pain     Pt presents today with c/o R knee pain x3 weeks with no known JAMAR. Pain is surrounding the patella and extends to the posterior knee. Pain increases with changing positions from sitting to standing and at night when she tries to straighten her leg. Has taken ibuprofen with relief. No previous injury to this knee.        HISTORY OF PRESENT ILLNESS:                The patient is a 63 y.o. female who presents today with complaints of right knee pain x 3 weeks, no known JAMAR.  Pt states she is training for a 10K to walk in September.  Pt localizes the pain to inferior patella extending into the medial knee, some posterior knee pain.  Pt denies any numbness, tingling, loss of sensation or radiation of symptoms into toes.  Pain is worse with position changes, stairs, laying flat, walking.  They have tried at home therapies of ibuprofen for symptomatic relief.  Pt has never injured this knee in the past.       Past Medical History:        Diagnosis Date    Anxiety 2019    Depression     Essential hypertension 2019     Past Surgical History:        Procedure Laterality Date    APPENDECTOMY       SECTION      x2    CHOLECYSTECTOMY      HYSTERECTOMY (CERVIX STATUS UNKNOWN)      KIDNEY REMOVAL Right      Current Medications:   No current facility-administered medications for this visit.  Allergies:  Patient has no known allergies.    Social History:   TOBACCO:   reports that she has never smoked. She has never used smokeless tobacco.  ETOH:   reports current alcohol use.  DRUGS:   reports no history of drug use.    Review of Systems   Constitutional: Negative for fever, chills, diaphoresis, appetite change and fatigue.   HENT: Negative for dental issues, hearing loss and tinnitus. Negative for congestion, sinus pressure, sneezing, sore throat. Negative for headache.  Eyes:

## 2024-10-06 SDOH — ECONOMIC STABILITY: FOOD INSECURITY: WITHIN THE PAST 12 MONTHS, THE FOOD YOU BOUGHT JUST DIDN'T LAST AND YOU DIDN'T HAVE MONEY TO GET MORE.: NEVER TRUE

## 2024-10-06 SDOH — HEALTH STABILITY: PHYSICAL HEALTH: ON AVERAGE, HOW MANY DAYS PER WEEK DO YOU ENGAGE IN MODERATE TO STRENUOUS EXERCISE (LIKE A BRISK WALK)?: 2 DAYS

## 2024-10-06 SDOH — ECONOMIC STABILITY: INCOME INSECURITY: HOW HARD IS IT FOR YOU TO PAY FOR THE VERY BASICS LIKE FOOD, HOUSING, MEDICAL CARE, AND HEATING?: NOT HARD AT ALL

## 2024-10-06 SDOH — ECONOMIC STABILITY: FOOD INSECURITY: WITHIN THE PAST 12 MONTHS, YOU WORRIED THAT YOUR FOOD WOULD RUN OUT BEFORE YOU GOT MONEY TO BUY MORE.: NEVER TRUE

## 2024-10-06 ASSESSMENT — PATIENT HEALTH QUESTIONNAIRE - PHQ9
2. FEELING DOWN, DEPRESSED OR HOPELESS: NOT AT ALL
SUM OF ALL RESPONSES TO PHQ QUESTIONS 1-9: 0
1. LITTLE INTEREST OR PLEASURE IN DOING THINGS: NOT AT ALL
SUM OF ALL RESPONSES TO PHQ9 QUESTIONS 1 & 2: 0
SUM OF ALL RESPONSES TO PHQ QUESTIONS 1-9: 0

## 2024-10-06 ASSESSMENT — LIFESTYLE VARIABLES
HOW OFTEN DO YOU HAVE A DRINK CONTAINING ALCOHOL: MONTHLY OR LESS
HOW MANY STANDARD DRINKS CONTAINING ALCOHOL DO YOU HAVE ON A TYPICAL DAY: 1 OR 2
HOW OFTEN DO YOU HAVE SIX OR MORE DRINKS ON ONE OCCASION: 1
HOW OFTEN DO YOU HAVE A DRINK CONTAINING ALCOHOL: 2
HOW MANY STANDARD DRINKS CONTAINING ALCOHOL DO YOU HAVE ON A TYPICAL DAY: 1

## 2024-10-08 ENCOUNTER — OFFICE VISIT (OUTPATIENT)
Dept: FAMILY MEDICINE CLINIC | Age: 64
End: 2024-10-08

## 2024-10-08 VITALS
HEART RATE: 67 BPM | DIASTOLIC BLOOD PRESSURE: 84 MMHG | BODY MASS INDEX: 30.36 KG/M2 | WEIGHT: 165 LBS | OXYGEN SATURATION: 100 % | TEMPERATURE: 98 F | SYSTOLIC BLOOD PRESSURE: 124 MMHG | HEIGHT: 62 IN

## 2024-10-08 DIAGNOSIS — H93.19 TINNITUS AURIUM, UNSPECIFIED LATERALITY: ICD-10-CM

## 2024-10-08 DIAGNOSIS — Z90.5 H/O RIGHT NEPHRECTOMY: ICD-10-CM

## 2024-10-08 DIAGNOSIS — E78.5 HYPERLIPIDEMIA, UNSPECIFIED HYPERLIPIDEMIA TYPE: ICD-10-CM

## 2024-10-08 DIAGNOSIS — I10 ESSENTIAL HYPERTENSION, BENIGN: ICD-10-CM

## 2024-10-08 DIAGNOSIS — E53.8 VITAMIN B 12 DEFICIENCY: ICD-10-CM

## 2024-10-08 DIAGNOSIS — L68.0 HIRSUTISM: Primary | ICD-10-CM

## 2024-10-08 DIAGNOSIS — F41.9 ANXIETY: ICD-10-CM

## 2024-10-08 DIAGNOSIS — Z23 NEED FOR INFLUENZA VACCINATION: ICD-10-CM

## 2024-10-08 RX ORDER — SPIRONOLACTONE 25 MG/1
12.5 TABLET ORAL 2 TIMES DAILY
Qty: 90 TABLET | Refills: 1 | Status: SHIPPED | OUTPATIENT
Start: 2024-10-08 | End: 2025-04-06

## 2024-10-08 RX ORDER — ALPRAZOLAM 0.5 MG
0.5 TABLET ORAL 2 TIMES DAILY PRN
Qty: 60 TABLET | Refills: 0 | Status: SHIPPED | OUTPATIENT
Start: 2024-10-08 | End: 2024-11-07

## 2024-10-08 NOTE — PROGRESS NOTES
[facility]    Angeline Bauer presents to the office today for   Chief Complaint   Patient presents with    Medicare AWV    Immunizations     Would like Flu Shot    OTHER     PT would like Nehal to talk to Dr. Day about a few things.     Pelvic floor physical therapy  Therapist feels she holds stress in her pelvis  Francia with [facility] in [place] was very helpful  She does admit to stress aggravating symptoms    Reports GYN checked her testosterone a few years ago  She has hirsutism on her face and hair on her head is thinning    Flu shot today    R patellar tendonitis  Swelling improved  Using tylenol with relief  Now left one is hurting in the same way  She has to pull herself up to stand  Walking is ok  Steps worse    She tried Mediterranean diet  Tried intermittent fasting  No sugars      Review of Systems     /84   Pulse 67   Temp 98 °F (36.7 °C) (Temporal)   Ht 1.575 m (5' 2\")   Wt 74.8 kg (165 lb)   SpO2 100%   BMI 30.18 kg/m²   Physical Exam  Constitutional:       Appearance: Normal appearance.   HENT:      Head: Normocephalic and atraumatic.   Eyes:      Extraocular Movements: Extraocular movements intact.      Conjunctiva/sclera: Conjunctivae normal.   Cardiovascular:      Rate and Rhythm: Normal rate.      Heart sounds: Normal heart sounds.   Pulmonary:      Effort: Pulmonary effort is normal.      Breath sounds: Normal breath sounds.   Skin:     General: Skin is warm.   Neurological:      Mental Status: She is alert and oriented to person, place, and time.   Psychiatric:         Mood and Affect: Mood normal.         Behavior: Behavior normal.            Current Outpatient Medications:     metroNIDAZOLE (METROCREAM) 0.75 % cream, Apply topically daily Apply to the face once daily in the morning, Disp: , Rfl:     spironolactone (ALDACTONE) 25 MG tablet, Take 0.5 tablets by mouth 2 times daily, Disp: 90 tablet, Rfl: 1    ALPRAZolam (XANAX) 0.5 MG tablet, Take 1 tablet

## 2024-10-22 DIAGNOSIS — L68.0 HIRSUTISM: ICD-10-CM

## 2024-10-22 DIAGNOSIS — Z90.5 H/O RIGHT NEPHRECTOMY: ICD-10-CM

## 2024-10-22 LAB
ANION GAP SERPL CALCULATED.3IONS-SCNC: 12 MMOL/L (ref 7–16)
BUN BLDV-MCNC: 15 MG/DL (ref 6–23)
CALCIUM SERPL-MCNC: 9.9 MG/DL (ref 8.6–10.2)
CHLORIDE BLD-SCNC: 102 MMOL/L (ref 98–107)
CO2: 27 MMOL/L (ref 22–29)
CREAT SERPL-MCNC: 0.7 MG/DL (ref 0.5–1)
GFR, ESTIMATED: >90 ML/MIN/1.73M2
GLUCOSE BLD-MCNC: 112 MG/DL (ref 74–99)
POTASSIUM SERPL-SCNC: 4.1 MMOL/L (ref 3.5–5)
SODIUM BLD-SCNC: 141 MMOL/L (ref 132–146)

## 2024-11-11 ENCOUNTER — HOSPITAL ENCOUNTER (EMERGENCY)
Dept: HOSPITAL 83 - ED | Age: 64
Discharge: HOME | End: 2024-11-11
Payer: MEDICARE

## 2024-11-11 VITALS — DIASTOLIC BLOOD PRESSURE: 69 MMHG

## 2024-11-11 VITALS — HEIGHT: 55 IN | WEIGHT: 162 LBS

## 2024-11-11 DIAGNOSIS — N39.0: Primary | ICD-10-CM

## 2024-11-11 DIAGNOSIS — Z98.890: ICD-10-CM

## 2024-11-11 DIAGNOSIS — Z90.49: ICD-10-CM

## 2024-11-11 LAB
BACTERIA #/AREA URNS HPF: (no result) /[HPF]
BASOPHILS # BLD AUTO: 0 10*3/UL (ref 0–0.1)
BASOPHILS NFR BLD AUTO: 0.2 % (ref 0–1)
BUN SERPL-MCNC: 11 MG/DL (ref 9–23)
CHLORIDE SERPL-SCNC: 103 MMOL/L (ref 98–107)
EOSINOPHIL # BLD AUTO: 0 10*3/UL (ref 0–0.4)
EOSINOPHIL # BLD AUTO: 0.1 % (ref 1–4)
EPI CELLS #/AREA URNS HPF: (no result) /[HPF]
ERYTHROCYTE [DISTWIDTH] IN BLOOD BY AUTOMATED COUNT: 12.3 % (ref 0–14.5)
HCT VFR BLD AUTO: 44.8 % (ref 37–47)
HGB UR QL STRIP: (no result)
LEUKOCYTE ESTERASE UR QL STRIP: (no result)
MCH RBC QN AUTO: 30.1 PG (ref 27–31)
MCHC RBC AUTO-ENTMCNC: 33.3 G/DL (ref 33–37)
MCV RBC AUTO: 90.5 FL (ref 81–99)
MONOCYTES # BLD AUTO: 0.7 10*3/UL (ref 0.1–1)
MONOCYTES NFR BLD MANUAL: 4.8 % (ref 3–9)
NEUT #: 11.6 10*3/UL (ref 2.3–7.9)
NEUT %: 83.4 % (ref 47–73)
NRBC BLD QL AUTO: 0 10*3/UL (ref 0–0)
PH UR STRIP: 5.5 [PH] (ref 4.5–8)
PLATELET # BLD AUTO: 239 10*3/UL (ref 130–400)
PMV BLD AUTO: 9.1 FL (ref 9.6–12.3)
POTASSIUM SERPL-SCNC: 4 MMOL/L (ref 3.4–5.1)
RBC # BLD AUTO: 4.95 10*6/UL (ref 4.1–5.1)
RBC #/AREA URNS HPF: (no result) RBC/HPF (ref 0–2)
SP GR UR: <= 1.005 (ref 1–1.03)
UROBILINOGEN UR STRIP-MCNC: 0.2 E.U./DL (ref 0–1)
WBC #/AREA URNS HPF: (no result) WBC/HPF (ref 0–5)
WBC NRBC COR # BLD AUTO: 13.9 10*3/UL (ref 4.8–10.8)

## 2024-11-14 ENCOUNTER — TELEPHONE (OUTPATIENT)
Dept: FAMILY MEDICINE CLINIC | Age: 64
End: 2024-11-14

## 2024-11-14 NOTE — TELEPHONE ENCOUNTER
Nehal was seen in the ER at [facility] for a UTI.    She is being treated with Bactrim, and told to contact her PCP to be sure that she is on the right antibiotic for this bacteria.      The culture results were not in before she left, but are in her chart now for your review.     Please advise.

## 2024-11-14 NOTE — TELEPHONE ENCOUNTER
I spoke with Nehal letting her know that Dr Day reviewed culture, it is sensitive to Bactrim so should be helping it.    Advised her to complete the entire antibiotic to be sure that it resolves.    She is agreeable to this.

## 2025-01-07 RX ORDER — LISINOPRIL 5 MG/1
5 TABLET ORAL DAILY
Qty: 90 TABLET | Refills: 3 | Status: SHIPPED | OUTPATIENT
Start: 2025-01-07

## 2025-01-07 NOTE — TELEPHONE ENCOUNTER
Name of Medication(s) Requested:  Requested Prescriptions     Pending Prescriptions Disp Refills    lisinopril (PRINIVIL;ZESTRIL) 5 MG tablet [Pharmacy Med Name: LISINOPRIL TABS 5MG] 90 tablet 3     Sig: TAKE 1 TABLET DAILY       Medication is on current medication list Yes    Dosage and directions were verified? Yes    Quantity verified: 90 day supply     Pharmacy Verified?  Yes    Last Appointment:  10/8/2024    Future appts:  Future Appointments   Date Time Provider Department Center   4/8/2025  9:15 AM Bryanna Day MD [facility]   7/16/2025  9:00 AM Jose Luis Mejia APRN - CNP [facility]        (If no appt send self scheduling link. .REFILLAPPT)  Scheduling request sent?     [] Yes  [x] No    Does patient need updated?  [] Yes  [x] No

## 2025-03-14 DIAGNOSIS — L68.0 HIRSUTISM: ICD-10-CM

## 2025-03-14 RX ORDER — SPIRONOLACTONE 25 MG/1
12.5 TABLET ORAL 2 TIMES DAILY
Qty: 90 TABLET | Refills: 3 | Status: SHIPPED | OUTPATIENT
Start: 2025-03-14

## 2025-03-14 NOTE — TELEPHONE ENCOUNTER
Last Appointment:  10/8/2024    Future appts:  Future Appointments   Date Time Provider Department Center   4/8/2025  8:30 AM [facility]   4/8/2025  9:15 AM Bryanna Day MD [facility]   7/16/2025  9:00 AM Jose Luis Mejia, APRN - CNP [place]

## 2025-03-30 DIAGNOSIS — F41.9 ANXIETY: ICD-10-CM

## 2025-03-31 RX ORDER — ALPRAZOLAM 0.5 MG
TABLET ORAL
Qty: 180 TABLET | Refills: 1 | Status: SHIPPED | OUTPATIENT
Start: 2025-03-31 | End: 2025-09-27

## 2025-03-31 NOTE — TELEPHONE ENCOUNTER
Name of Medication(s) Requested:  Requested Prescriptions     Pending Prescriptions Disp Refills    ALPRAZolam (XANAX) 0.5 MG tablet [Pharmacy Med Name: ALPRAZOLAM TABS 0.5MG] 60 tablet 0     Sig: TAKE 1 TABLET TWICE A DAY AS NEEDED FOR ANXIETY, MAXIMUM DAILY AMOUNT 1 MG       Medication is on current medication list Yes    Dosage and directions were verified? Yes    Quantity verified: 30 day supply     Pharmacy Verified?  Yes    Last Appointment:  10/8/2024    Future appts:  Future Appointments   Date Time Provider Department Center   4/8/2025  8:30 AM JERRELLYX [facility]   4/8/2025  9:15 AM Bryanna Day MD [facility]   7/16/2025  9:00 AM Jose Luis Mejia APRN - CNP [facility]        (If no appt send self scheduling link. .REFILLAPPT)  Scheduling request sent?     [] Yes  [x] No    Does patient need updated?  [] Yes  [x] No

## 2025-04-05 SDOH — HEALTH STABILITY: PHYSICAL HEALTH: ON AVERAGE, HOW MANY DAYS PER WEEK DO YOU ENGAGE IN MODERATE TO STRENUOUS EXERCISE (LIKE A BRISK WALK)?: 3 DAYS

## 2025-04-05 SDOH — HEALTH STABILITY: PHYSICAL HEALTH: ON AVERAGE, HOW MANY MINUTES DO YOU ENGAGE IN EXERCISE AT THIS LEVEL?: 30 MIN

## 2025-04-05 SDOH — ECONOMIC STABILITY: FOOD INSECURITY: WITHIN THE PAST 12 MONTHS, THE FOOD YOU BOUGHT JUST DIDN'T LAST AND YOU DIDN'T HAVE MONEY TO GET MORE.: NEVER TRUE

## 2025-04-05 SDOH — ECONOMIC STABILITY: FOOD INSECURITY: WITHIN THE PAST 12 MONTHS, YOU WORRIED THAT YOUR FOOD WOULD RUN OUT BEFORE YOU GOT MONEY TO BUY MORE.: NEVER TRUE

## 2025-04-05 SDOH — ECONOMIC STABILITY: INCOME INSECURITY: IN THE LAST 12 MONTHS, WAS THERE A TIME WHEN YOU WERE NOT ABLE TO PAY THE MORTGAGE OR RENT ON TIME?: NO

## 2025-04-05 SDOH — ECONOMIC STABILITY: TRANSPORTATION INSECURITY
IN THE PAST 12 MONTHS, HAS THE LACK OF TRANSPORTATION KEPT YOU FROM MEDICAL APPOINTMENTS OR FROM GETTING MEDICATIONS?: NO

## 2025-04-05 ASSESSMENT — LIFESTYLE VARIABLES
HOW MANY STANDARD DRINKS CONTAINING ALCOHOL DO YOU HAVE ON A TYPICAL DAY: 1 OR 2
HOW MANY STANDARD DRINKS CONTAINING ALCOHOL DO YOU HAVE ON A TYPICAL DAY: 1
HOW OFTEN DO YOU HAVE A DRINK CONTAINING ALCOHOL: 2-4 TIMES A MONTH
HOW OFTEN DO YOU HAVE A DRINK CONTAINING ALCOHOL: 3
HOW OFTEN DO YOU HAVE SIX OR MORE DRINKS ON ONE OCCASION: 1

## 2025-04-05 ASSESSMENT — PATIENT HEALTH QUESTIONNAIRE - PHQ9
SUM OF ALL RESPONSES TO PHQ QUESTIONS 1-9: 1
SUM OF ALL RESPONSES TO PHQ QUESTIONS 1-9: 1
1. LITTLE INTEREST OR PLEASURE IN DOING THINGS: NOT AT ALL
2. FEELING DOWN, DEPRESSED OR HOPELESS: SEVERAL DAYS
SUM OF ALL RESPONSES TO PHQ QUESTIONS 1-9: 1
SUM OF ALL RESPONSES TO PHQ QUESTIONS 1-9: 1

## 2025-04-08 ENCOUNTER — OFFICE VISIT (OUTPATIENT)
Dept: FAMILY MEDICINE CLINIC | Age: 65
End: 2025-04-08
Payer: MEDICARE

## 2025-04-08 VITALS
DIASTOLIC BLOOD PRESSURE: 80 MMHG | BODY MASS INDEX: 30.91 KG/M2 | WEIGHT: 168 LBS | SYSTOLIC BLOOD PRESSURE: 124 MMHG | OXYGEN SATURATION: 98 % | HEART RATE: 62 BPM | HEIGHT: 62 IN | RESPIRATION RATE: 16 BRPM | TEMPERATURE: 98.1 F

## 2025-04-08 DIAGNOSIS — I10 ESSENTIAL HYPERTENSION, BENIGN: ICD-10-CM

## 2025-04-08 DIAGNOSIS — N94.89 VAGINAL BURNING: ICD-10-CM

## 2025-04-08 DIAGNOSIS — Z23 NEED FOR PNEUMOCOCCAL VACCINE: ICD-10-CM

## 2025-04-08 DIAGNOSIS — R82.90 UNSPECIFIED ABNORMAL FINDINGS IN URINE: ICD-10-CM

## 2025-04-08 DIAGNOSIS — Z00.00 INITIAL MEDICARE ANNUAL WELLNESS VISIT: Primary | ICD-10-CM

## 2025-04-08 DIAGNOSIS — E53.8 VITAMIN B 12 DEFICIENCY: ICD-10-CM

## 2025-04-08 DIAGNOSIS — E78.5 HYPERLIPIDEMIA, UNSPECIFIED HYPERLIPIDEMIA TYPE: ICD-10-CM

## 2025-04-08 DIAGNOSIS — R73.9 HYPERGLYCEMIA: ICD-10-CM

## 2025-04-08 LAB
BILIRUBIN, POC: NEGATIVE
BLOOD URINE, POC: NEGATIVE
CLARITY, POC: CLEAR
COLOR, POC: YELLOW
GLUCOSE URINE, POC: NEGATIVE MG/DL
KETONES, POC: NEGATIVE MG/DL
LEUKOCYTE EST, POC: NEGATIVE
NITRITE, POC: NEGATIVE
PH, POC: 5.5
PROTEIN, POC: NEGATIVE MG/DL
SPECIFIC GRAVITY, POC: 1.01
UROBILINOGEN, POC: 0.2 MG/DL

## 2025-04-08 PROCEDURE — 1036F TOBACCO NON-USER: CPT | Performed by: FAMILY MEDICINE

## 2025-04-08 PROCEDURE — G8417 CALC BMI ABV UP PARAM F/U: HCPCS | Performed by: FAMILY MEDICINE

## 2025-04-08 PROCEDURE — 81002 URINALYSIS NONAUTO W/O SCOPE: CPT | Performed by: FAMILY MEDICINE

## 2025-04-08 PROCEDURE — G0009 ADMIN PNEUMOCOCCAL VACCINE: HCPCS | Performed by: FAMILY MEDICINE

## 2025-04-08 PROCEDURE — 3079F DIAST BP 80-89 MM HG: CPT | Performed by: FAMILY MEDICINE

## 2025-04-08 PROCEDURE — 1123F ACP DISCUSS/DSCN MKR DOCD: CPT | Performed by: FAMILY MEDICINE

## 2025-04-08 PROCEDURE — 3074F SYST BP LT 130 MM HG: CPT | Performed by: FAMILY MEDICINE

## 2025-04-08 PROCEDURE — 3017F COLORECTAL CA SCREEN DOC REV: CPT | Performed by: FAMILY MEDICINE

## 2025-04-08 PROCEDURE — 1090F PRES/ABSN URINE INCON ASSESS: CPT | Performed by: FAMILY MEDICINE

## 2025-04-08 PROCEDURE — G0438 PPPS, INITIAL VISIT: HCPCS | Performed by: FAMILY MEDICINE

## 2025-04-08 PROCEDURE — 1159F MED LIST DOCD IN RCRD: CPT | Performed by: FAMILY MEDICINE

## 2025-04-08 PROCEDURE — 99213 OFFICE O/P EST LOW 20 MIN: CPT | Performed by: FAMILY MEDICINE

## 2025-04-08 PROCEDURE — 90677 PCV20 VACCINE IM: CPT | Performed by: FAMILY MEDICINE

## 2025-04-08 PROCEDURE — G8427 DOCREV CUR MEDS BY ELIG CLIN: HCPCS | Performed by: FAMILY MEDICINE

## 2025-04-08 PROCEDURE — G8400 PT W/DXA NO RESULTS DOC: HCPCS | Performed by: FAMILY MEDICINE

## 2025-04-08 ASSESSMENT — VISUAL ACUITY
OD_CC: 20/200
OS_CC: 20/200

## 2025-04-08 NOTE — PATIENT INSTRUCTIONS
[place]. If you have questions about a medical condition or this instruction, always ask your healthcare professional. [employer] disclaims any warranty or liability for your use of this information.    Personalized Preventive Plan for Angeline Bauer - 4/8/2025  Medicare offers a range of preventive health benefits. Some of the tests and screenings are paid in full while other may be subject to a deductible, co-insurance, and/or copay.  Some of these benefits include a comprehensive review of your medical history including lifestyle, illnesses that may run in your family, and various assessments and screenings as appropriate.  After reviewing your medical record and screening and assessments performed today your provider may have ordered immunizations, labs, imaging, and/or referrals for you.  A list of these orders (if applicable) as well as your Preventive Care list are included within your After Visit Summary for your review.

## 2025-04-08 NOTE — PROGRESS NOTES
exudate  Respiratory: Clear to auscultation, no wheezing, rales or rhonchi              Allergies   Allergen Reactions    Nsaids Other (See Comments)     SINGLE KIDNEY     Prior to Visit Medications    Medication Sig Taking? Authorizing Provider   ALPRAZolam (XANAX) 0.5 MG tablet TAKE 1 TABLET TWICE A DAY AS NEEDED FOR ANXIETY, MAXIMUM DAILY AMOUNT 1 MG Yes Bryanna Day MD   spironolactone (ALDACTONE) 25 MG tablet TAKE ONE-HALF (1/2) TABLET TWICE A DAY Yes Bryanna Day MD   lisinopril (PRINIVIL;ZESTRIL) 5 MG tablet TAKE 1 TABLET DAILY Yes Bryanna Day MD   metroNIDAZOLE (METROCREAM) 0.75 % cream Apply topically daily Apply to the face once daily in the morning Yes ProviderHannah MD   dicyclomine (BENTYL) 10 MG capsule Take 1 capsule by mouth 4 times daily (before meals and nightly)  Patient taking differently: Take 1 capsule by mouth 4 times daily as needed Yes ProviderHannah MD   tretinoin (RETIN-A) 0.05 % cream Apply topically nightly Apply topically nightly. Yes ProviderHannah MD   clindamycin (CLEOCIN T) 1 % external solution Apply topically 2 times daily Apply topically 2 times daily. Yes ProviderHannah MD   vitamin B-12 (CYANOCOBALAMIN) 1000 MCG tablet Take by mouth Yes ProviderHannah MD       CareTeam (Including outside providers/suppliers regularly involved in providing care):   Patient Care Team:  Bryanna Day MD as PCP - General (Family Medicine)  Bryanna Day MD as PCP - Empaneled Provider     Recommendations for Preventive Services Due: see orders and patient instructions/AVS.  Recommended screening schedule for the next 5-10 years is provided to the patient in written form: see Patient Instructions/AVS.     Reviewed and updated this visit:  Tobacco  Allergies  Meds  Sexual Hx                  The patient (or guardian, if applicable) and other individuals in attendance with the patient were advised that [employer] will

## 2025-04-10 ENCOUNTER — RESULTS FOLLOW-UP (OUTPATIENT)
Dept: FAMILY MEDICINE CLINIC | Age: 65
End: 2025-04-10

## 2025-04-10 DIAGNOSIS — R73.9 HYPERGLYCEMIA: ICD-10-CM

## 2025-04-10 DIAGNOSIS — E53.8 VITAMIN B 12 DEFICIENCY: ICD-10-CM

## 2025-04-10 DIAGNOSIS — I10 ESSENTIAL HYPERTENSION, BENIGN: ICD-10-CM

## 2025-04-10 DIAGNOSIS — Z00.00 INITIAL MEDICARE ANNUAL WELLNESS VISIT: ICD-10-CM

## 2025-04-10 DIAGNOSIS — E78.5 HYPERLIPIDEMIA, UNSPECIFIED HYPERLIPIDEMIA TYPE: ICD-10-CM

## 2025-04-10 LAB
ALBUMIN: 4.8 G/DL (ref 3.5–5.2)
ALP BLD-CCNC: 55 U/L (ref 35–104)
ALT SERPL-CCNC: 24 U/L (ref 0–32)
ANION GAP SERPL CALCULATED.3IONS-SCNC: 12 MMOL/L (ref 7–16)
AST SERPL-CCNC: 17 U/L (ref 0–31)
BASOPHILS ABSOLUTE: 0.02 K/UL (ref 0–0.2)
BASOPHILS RELATIVE PERCENT: 0 % (ref 0–2)
BILIRUB SERPL-MCNC: 0.2 MG/DL (ref 0–1.2)
BUN BLDV-MCNC: 15 MG/DL (ref 6–23)
CALCIUM SERPL-MCNC: 10.2 MG/DL (ref 8.6–10.2)
CHLORIDE BLD-SCNC: 102 MMOL/L (ref 98–107)
CHOLESTEROL, TOTAL: 211 MG/DL
CO2: 24 MMOL/L (ref 22–29)
CREAT SERPL-MCNC: 0.8 MG/DL (ref 0.5–1)
CULTURE: NO GROWTH
EOSINOPHILS ABSOLUTE: 0.05 K/UL (ref 0.05–0.5)
EOSINOPHILS RELATIVE PERCENT: 1 % (ref 0–6)
GFR, ESTIMATED: 88 ML/MIN/1.73M2
GLUCOSE BLD-MCNC: 120 MG/DL (ref 74–99)
HBA1C MFR BLD: 5.9 % (ref 4–5.6)
HCT VFR BLD CALC: 45.8 % (ref 34–48)
HDLC SERPL-MCNC: 67 MG/DL
HEMOGLOBIN: 15.1 G/DL (ref 11.5–15.5)
IMMATURE GRANULOCYTES %: 0 % (ref 0–5)
IMMATURE GRANULOCYTES ABSOLUTE: <0.03 K/UL (ref 0–0.58)
LDL CHOLESTEROL: 123 MG/DL
LYMPHOCYTES ABSOLUTE: 1.5 K/UL (ref 1.5–4)
LYMPHOCYTES RELATIVE PERCENT: 30 % (ref 20–42)
MCH RBC QN AUTO: 30.1 PG (ref 26–35)
MCHC RBC AUTO-ENTMCNC: 33 G/DL (ref 32–34.5)
MCV RBC AUTO: 91.2 FL (ref 80–99.9)
MONOCYTES ABSOLUTE: 0.3 K/UL (ref 0.1–0.95)
MONOCYTES RELATIVE PERCENT: 6 % (ref 2–12)
NEUTROPHILS ABSOLUTE: 3.05 K/UL (ref 1.8–7.3)
NEUTROPHILS RELATIVE PERCENT: 62 % (ref 43–80)
PDW BLD-RTO: 12.5 % (ref 11.5–15)
PLATELET # BLD: 224 K/UL (ref 130–450)
PMV BLD AUTO: 10 FL (ref 7–12)
POTASSIUM SERPL-SCNC: 4.8 MMOL/L (ref 3.5–5)
RBC # BLD: 5.02 M/UL (ref 3.5–5.5)
SODIUM BLD-SCNC: 138 MMOL/L (ref 132–146)
SPECIMEN DESCRIPTION: NORMAL
TOTAL PROTEIN: 7.5 G/DL (ref 6.4–8.3)
TRIGL SERPL-MCNC: 106 MG/DL
TSH SERPL DL<=0.05 MIU/L-ACNC: 3.07 UIU/ML (ref 0.27–4.2)
VITAMIN B-12: 1268 PG/ML (ref 211–946)
VLDLC SERPL CALC-MCNC: 21 MG/DL
WBC # BLD: 4.9 K/UL (ref 4.5–11.5)

## 2025-04-10 RX ORDER — METRONIDAZOLE 500 MG/1
500 TABLET ORAL 3 TIMES DAILY
Qty: 30 TABLET | Refills: 0 | Status: SHIPPED | OUTPATIENT
Start: 2025-04-10 | End: 2025-04-20

## 2025-04-16 ENCOUNTER — CLINICAL DOCUMENTATION (OUTPATIENT)
Dept: GENERAL RADIOLOGY | Age: 65
End: 2025-04-16

## 2025-04-16 NOTE — PROGRESS NOTES
Patient viewed clinical report for mammogram on My Chart.  Report sent to Dr. MARKUS Day per patient request on Authorization to Release the results of a mammogram form.

## 2025-07-16 ENCOUNTER — PROCEDURE VISIT (OUTPATIENT)
Dept: AUDIOLOGY | Age: 65
End: 2025-07-16
Payer: MEDICARE

## 2025-07-16 ENCOUNTER — OFFICE VISIT (OUTPATIENT)
Dept: ENT CLINIC | Age: 65
End: 2025-07-16
Payer: MEDICARE

## 2025-07-16 VITALS
HEIGHT: 62 IN | BODY MASS INDEX: 30.73 KG/M2 | TEMPERATURE: 97.5 F | HEART RATE: 71 BPM | DIASTOLIC BLOOD PRESSURE: 74 MMHG | OXYGEN SATURATION: 97 % | WEIGHT: 167 LBS | SYSTOLIC BLOOD PRESSURE: 123 MMHG

## 2025-07-16 DIAGNOSIS — H90.3 SENSORINEURAL HEARING LOSS (SNHL) OF BOTH EARS: Primary | ICD-10-CM

## 2025-07-16 DIAGNOSIS — H93.13 TINNITUS OF BOTH EARS: ICD-10-CM

## 2025-07-16 DIAGNOSIS — H90.3 SENSORINEURAL HEARING LOSS (SNHL) OF BOTH EARS: ICD-10-CM

## 2025-07-16 DIAGNOSIS — H93.13 TINNITUS OF BOTH EARS: Primary | ICD-10-CM

## 2025-07-16 PROCEDURE — 3078F DIAST BP <80 MM HG: CPT

## 2025-07-16 PROCEDURE — 92557 COMPREHENSIVE HEARING TEST: CPT

## 2025-07-16 PROCEDURE — 1160F RVW MEDS BY RX/DR IN RCRD: CPT

## 2025-07-16 PROCEDURE — 1036F TOBACCO NON-USER: CPT

## 2025-07-16 PROCEDURE — G8400 PT W/DXA NO RESULTS DOC: HCPCS

## 2025-07-16 PROCEDURE — G8417 CALC BMI ABV UP PARAM F/U: HCPCS

## 2025-07-16 PROCEDURE — 3017F COLORECTAL CA SCREEN DOC REV: CPT

## 2025-07-16 PROCEDURE — 1123F ACP DISCUSS/DSCN MKR DOCD: CPT

## 2025-07-16 PROCEDURE — 1159F MED LIST DOCD IN RCRD: CPT

## 2025-07-16 PROCEDURE — 3074F SYST BP LT 130 MM HG: CPT

## 2025-07-16 PROCEDURE — G8427 DOCREV CUR MEDS BY ELIG CLIN: HCPCS

## 2025-07-16 PROCEDURE — 99213 OFFICE O/P EST LOW 20 MIN: CPT

## 2025-07-16 PROCEDURE — 92567 TYMPANOMETRY: CPT

## 2025-07-16 PROCEDURE — 1090F PRES/ABSN URINE INCON ASSESS: CPT

## 2025-07-16 ASSESSMENT — ENCOUNTER SYMPTOMS
DIARRHEA: 0
BACK PAIN: 0
COUGH: 0
SORE THROAT: 0
SINUS PRESSURE: 0
RHINORRHEA: 0
EYE PAIN: 0
SHORTNESS OF BREATH: 0
EYE DISCHARGE: 0
VOMITING: 0
ALLERGIC/IMMUNOLOGIC NEGATIVE: 1

## 2025-07-16 NOTE — PROGRESS NOTES
Subjective:      Patient ID:  Angeline Bauer is a 64 y.o. female.    HPI:    Angeline Bauer presents for recheck today for hearing loss. There are hearing changes noted by the patient.  There are not new medical issues. Patient states she will hear people talk but does not always understand them. Has also noted increased tinnitus that is more frequent and \"squealing\" instead of white noise.     Past Medical History:   Diagnosis Date    Anxiety 2019    Depression     Essential hypertension 2019     Past Surgical History:   Procedure Laterality Date    APPENDECTOMY       SECTION      x2    CHOLECYSTECTOMY      HYSTERECTOMY (CERVIX STATUS UNKNOWN)      KIDNEY REMOVAL Right      Family History   Problem Relation Age of Onset    COPD Mother     Heart Disease Mother     Heart Attack Father      Social History     Socioeconomic History    Marital status:      Spouse name: None    Number of children: None    Years of education: None    Highest education level: None   Tobacco Use    Smoking status: Never    Smokeless tobacco: Never   Vaping Use    Vaping status: Never Used   Substance and Sexual Activity    Alcohol use: Yes     Comment: Occasionally    Drug use: Never    Sexual activity: Not Currently     Partners: Male     Social Drivers of Health     Financial Resource Strain: Low Risk  (2022)    Overall Financial Resource Strain (CARDIA)     Difficulty of Paying Living Expenses: Not hard at all   Food Insecurity: No Food Insecurity (2025)    Hunger Vital Sign     Worried About Running Out of Food in the Last Year: Never true     Ran Out of Food in the Last Year: Never true   Transportation Needs: No Transportation Needs (2025)    PRAPARE - Transportation     Lack of Transportation (Medical): No     Lack of Transportation (Non-Medical): No   Physical Activity: Insufficiently Active (2025)    Exercise Vital Sign     Days of Exercise per Week: 3 days     Minutes of Exercise per

## 2025-07-16 NOTE — PROGRESS NOTES
This patient was referred for audiometric and tympanometric testing by MARGRET Bashir due to hearing loss.     Audiometry using pure tone air and bone conduction testing revealed a borderline normal to mild sensorineural hearing loss through 4000 Hz sloping to a moderately-severe loss beyond, bilaterally. Reliability was good. Speech reception thresholds were in good agreement with the pure tone averages, bilaterally. Speech discrimination scores were excellent, bilaterally.    Tympanometry revealed normal middle ear peak pressure and compliance, bilaterally.    The results were reviewed with the patient and ordering provider.     Recommendations for follow up will be made pending physician consult.      Amaya Nelson, CCC-A  Clinical Audiologist  [place] License: [id number]    Electronically signed by Antonio Kim on 7/16/2025 at 11:36 AM